# Patient Record
Sex: MALE | Race: WHITE | Employment: UNEMPLOYED | URBAN - METROPOLITAN AREA
[De-identification: names, ages, dates, MRNs, and addresses within clinical notes are randomized per-mention and may not be internally consistent; named-entity substitution may affect disease eponyms.]

---

## 2017-05-18 ENCOUNTER — HOSPITAL ENCOUNTER (OUTPATIENT)
Dept: PHYSICAL THERAPY | Age: 52
Discharge: HOME | End: 2017-05-18
Payer: MEDICARE

## 2017-05-18 PROCEDURE — G8985 CARRY GOAL STATUS: HCPCS

## 2017-05-18 PROCEDURE — G8984 CARRY CURRENT STATUS: HCPCS

## 2017-05-18 PROCEDURE — 97110 THERAPEUTIC EXERCISES: CPT

## 2017-05-18 PROCEDURE — 97162 PT EVAL MOD COMPLEX 30 MIN: CPT

## 2017-05-19 NOTE — PROGRESS NOTES
Ambulatory/Rehab Services H2 Model Falls Risk Assessment    Risk Factor Pts. ·   Confusion/Disorientation/Impulsivity  []    4 ·   Symptomatic Depression  []   2 ·   Altered Elimination  []   1 ·   Dizziness/Vertigo  []   1 ·   Gender (Male)  [x]   1 ·   Any administered antiepileptics (anticonvulsants):  []   2 ·   Any administered benzodiazepines:  []   1 ·   Visual Impairment (specify):  []   1 ·   Portable Oxygen Use  []   1 ·   Orthostatic ? BP  []   1 ·   History of Recent Falls (within 3 mos.)  []   5     Ability to Rise from Chair (choose one) Pts. ·   Ability to rise in a single movement  [x]   0 ·   Pushes up, successful in one attempt  []   1 ·   Multiple attempts, but successful  []   3 ·   Unable to rise without assistance  []   4   Total: (5 or greater = High Risk) 1     Falls Prevention Plan:   []                Physical Limitations to Exercise (specify):   []                Mobility Assistance Device (type):   []                Exercise/Equipment Adaptation (specify):    ©2010 [facility] [address] Patent #[id number].  Federal Law prohibits the replication, distribution or use without written permission from [facility] [employer]
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
## 2017-05-22 ENCOUNTER — HOSPITAL ENCOUNTER (OUTPATIENT)
Dept: PHYSICAL THERAPY | Age: 52
Discharge: HOME | End: 2017-05-22
Payer: MEDICARE

## 2017-05-22 PROCEDURE — 97110 THERAPEUTIC EXERCISES: CPT
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
## 2017-05-24 ENCOUNTER — HOSPITAL ENCOUNTER (OUTPATIENT)
Dept: PHYSICAL THERAPY | Age: 52
Discharge: HOME | End: 2017-05-24
Payer: MEDICARE

## 2017-05-24 PROCEDURE — 97140 MANUAL THERAPY 1/> REGIONS: CPT

## 2017-05-24 PROCEDURE — 97110 THERAPEUTIC EXERCISES: CPT
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
## 2017-05-31 ENCOUNTER — HOSPITAL ENCOUNTER (OUTPATIENT)
Dept: PHYSICAL THERAPY | Age: 52
Discharge: HOME | End: 2017-05-31
Payer: MEDICARE

## 2017-05-31 PROCEDURE — 97140 MANUAL THERAPY 1/> REGIONS: CPT

## 2017-05-31 PROCEDURE — 97110 THERAPEUTIC EXERCISES: CPT
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
## 2017-06-05 ENCOUNTER — HOSPITAL ENCOUNTER (OUTPATIENT)
Dept: PHYSICAL THERAPY | Age: 52
Discharge: HOME | End: 2017-06-05
Payer: MEDICARE

## 2017-06-05 PROCEDURE — 97110 THERAPEUTIC EXERCISES: CPT
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
## 2017-06-07 ENCOUNTER — HOSPITAL ENCOUNTER (OUTPATIENT)
Dept: PHYSICAL THERAPY | Age: 52
Discharge: HOME | End: 2017-06-07
Payer: MEDICARE

## 2017-06-07 PROCEDURE — 97140 MANUAL THERAPY 1/> REGIONS: CPT

## 2017-06-07 PROCEDURE — 97110 THERAPEUTIC EXERCISES: CPT
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
## 2017-06-12 ENCOUNTER — HOSPITAL ENCOUNTER (OUTPATIENT)
Dept: PHYSICAL THERAPY | Age: 52
Discharge: HOME | End: 2017-06-12
Payer: MEDICARE

## 2017-06-12 PROCEDURE — 97110 THERAPEUTIC EXERCISES: CPT

## 2017-06-12 PROCEDURE — 97140 MANUAL THERAPY 1/> REGIONS: CPT
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
## 2017-06-14 ENCOUNTER — HOSPITAL ENCOUNTER (OUTPATIENT)
Dept: PHYSICAL THERAPY | Age: 52
Discharge: HOME | End: 2017-06-14
Payer: MEDICARE

## 2017-06-14 PROCEDURE — 97014 ELECTRIC STIMULATION THERAPY: CPT

## 2017-06-14 PROCEDURE — 97110 THERAPEUTIC EXERCISES: CPT
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
## 2017-06-19 ENCOUNTER — HOSPITAL ENCOUNTER (OUTPATIENT)
Dept: PHYSICAL THERAPY | Age: 52
Discharge: HOME | End: 2017-06-19
Payer: MEDICARE

## 2017-06-19 PROCEDURE — 97110 THERAPEUTIC EXERCISES: CPT
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
## 2017-06-21 ENCOUNTER — HOSPITAL ENCOUNTER (OUTPATIENT)
Dept: PHYSICAL THERAPY | Age: 52
Discharge: HOME | End: 2017-06-21
Payer: MEDICARE

## 2017-06-21 PROCEDURE — G8985 CARRY GOAL STATUS: HCPCS

## 2017-06-21 PROCEDURE — G8986 CARRY D/C STATUS: HCPCS

## 2017-06-21 PROCEDURE — 97110 THERAPEUTIC EXERCISES: CPT
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
## 2017-09-01 ENCOUNTER — APPOINTMENT (OUTPATIENT)
Dept: GENERAL RADIOLOGY | Age: 52
End: 2017-09-01
Attending: ORTHOPAEDIC SURGERY
Payer: MEDICARE

## 2017-09-01 ENCOUNTER — HOSPITAL ENCOUNTER (OUTPATIENT)
Age: 52
Setting detail: OUTPATIENT SURGERY
Discharge: HOME | End: 2017-09-01
Attending: ORTHOPAEDIC SURGERY | Admitting: ORTHOPAEDIC SURGERY
Payer: MEDICARE

## 2017-09-01 ENCOUNTER — ANESTHESIA EVENT (OUTPATIENT)
Dept: SURGERY | Age: 52
End: 2017-09-01
Payer: MEDICARE

## 2017-09-01 ENCOUNTER — ANESTHESIA (OUTPATIENT)
Dept: SURGERY | Age: 52
End: 2017-09-01
Payer: MEDICARE

## 2017-09-01 VITALS
DIASTOLIC BLOOD PRESSURE: 81 MMHG | RESPIRATION RATE: 18 BRPM | BODY MASS INDEX: 35.13 KG/M2 | HEIGHT: 74 IN | OXYGEN SATURATION: 94 % | SYSTOLIC BLOOD PRESSURE: 135 MMHG | WEIGHT: 273.7 LBS | HEART RATE: 71 BPM | TEMPERATURE: 98.5 F

## 2017-09-01 DIAGNOSIS — Z96.9 RETAINED ORTHOPEDIC HARDWARE: Primary | ICD-10-CM

## 2017-09-01 PROCEDURE — 74011250637 HC RX REV CODE- 250/637: Performed by: ANESTHESIOLOGY

## 2017-09-01 PROCEDURE — 77030021122 HC SPLNT MAT FST BSNM -A: Performed by: ORTHOPAEDIC SURGERY

## 2017-09-01 PROCEDURE — 74011250636 HC RX REV CODE- 250/636: Performed by: ANESTHESIOLOGY

## 2017-09-01 PROCEDURE — 77030020143 HC AIRWY LARYN INTUB CGAS -A: Performed by: NURSE ANESTHETIST, CERTIFIED REGISTERED

## 2017-09-01 PROCEDURE — 76060000033 HC ANESTHESIA 1 TO 1.5 HR: Performed by: ORTHOPAEDIC SURGERY

## 2017-09-01 PROCEDURE — 73600 X-RAY EXAM OF ANKLE: CPT

## 2017-09-01 PROCEDURE — 76942 ECHO GUIDE FOR BIOPSY: CPT | Performed by: ORTHOPAEDIC SURGERY

## 2017-09-01 PROCEDURE — 76210000063 HC OR PH I REC FIRST 0.5 HR: Performed by: ORTHOPAEDIC SURGERY

## 2017-09-01 PROCEDURE — 77030000032 HC CUF TRNQT ZIMM -B: Performed by: ORTHOPAEDIC SURGERY

## 2017-09-01 PROCEDURE — 74011250636 HC RX REV CODE- 250/636: Performed by: NURSE PRACTITIONER

## 2017-09-01 PROCEDURE — 74011000250 HC RX REV CODE- 250

## 2017-09-01 PROCEDURE — 76010000161 HC OR TIME 1 TO 1.5 HR INTENSV-TIER 1: Performed by: ORTHOPAEDIC SURGERY

## 2017-09-01 PROCEDURE — 77030002933 HC SUT MCRYL J&J -A: Performed by: ORTHOPAEDIC SURGERY

## 2017-09-01 PROCEDURE — 77030008467 HC STPLR SKN COVD -B: Performed by: ORTHOPAEDIC SURGERY

## 2017-09-01 PROCEDURE — 77030018836 HC SOL IRR NACL ICUM -A: Performed by: ORTHOPAEDIC SURGERY

## 2017-09-01 PROCEDURE — 76210000020 HC REC RM PH II FIRST 0.5 HR: Performed by: ORTHOPAEDIC SURGERY

## 2017-09-01 PROCEDURE — 77030011640 HC PAD GRND REM COVD -A: Performed by: ORTHOPAEDIC SURGERY

## 2017-09-01 PROCEDURE — 77030020782 HC GWN BAIR PAWS FLX 3M -B: Performed by: NURSE ANESTHETIST, CERTIFIED REGISTERED

## 2017-09-01 PROCEDURE — 74011250636 HC RX REV CODE- 250/636

## 2017-09-01 PROCEDURE — 76010010054 HC POST OP PAIN BLOCK: Performed by: ORTHOPAEDIC SURGERY

## 2017-09-01 RX ORDER — SODIUM CHLORIDE, SODIUM LACTATE, POTASSIUM CHLORIDE, CALCIUM CHLORIDE 600; 310; 30; 20 MG/100ML; MG/100ML; MG/100ML; MG/100ML
100 INJECTION, SOLUTION INTRAVENOUS CONTINUOUS
Status: DISCONTINUED | OUTPATIENT
Start: 2017-09-01 | End: 2017-09-02 | Stop reason: HOSPADM

## 2017-09-01 RX ORDER — SODIUM CHLORIDE, SODIUM LACTATE, POTASSIUM CHLORIDE, CALCIUM CHLORIDE 600; 310; 30; 20 MG/100ML; MG/100ML; MG/100ML; MG/100ML
75 INJECTION, SOLUTION INTRAVENOUS
Status: DISCONTINUED | OUTPATIENT
Start: 2017-09-01 | End: 2017-09-02 | Stop reason: HOSPADM

## 2017-09-01 RX ORDER — CEFAZOLIN SODIUM IN 0.9 % NACL 2 G/50 ML
2 INTRAVENOUS SOLUTION, PIGGYBACK (ML) INTRAVENOUS
Status: COMPLETED | OUTPATIENT
Start: 2017-09-01 | End: 2017-09-01

## 2017-09-01 RX ORDER — HYDROMORPHONE HYDROCHLORIDE 2 MG/ML
0.5 INJECTION, SOLUTION INTRAMUSCULAR; INTRAVENOUS; SUBCUTANEOUS
Status: DISCONTINUED | OUTPATIENT
Start: 2017-09-01 | End: 2017-09-02 | Stop reason: HOSPADM

## 2017-09-01 RX ORDER — SODIUM CHLORIDE 0.9 % (FLUSH) 0.9 %
5-10 SYRINGE (ML) INJECTION EVERY 8 HOURS
Status: DISCONTINUED | OUTPATIENT
Start: 2017-09-01 | End: 2017-09-01 | Stop reason: HOSPADM

## 2017-09-01 RX ORDER — SODIUM CHLORIDE 0.9 % (FLUSH) 0.9 %
5-10 SYRINGE (ML) INJECTION AS NEEDED
Status: DISCONTINUED | OUTPATIENT
Start: 2017-09-01 | End: 2017-09-01 | Stop reason: HOSPADM

## 2017-09-01 RX ORDER — FENTANYL CITRATE 50 UG/ML
100 INJECTION, SOLUTION INTRAMUSCULAR; INTRAVENOUS ONCE
Status: COMPLETED | OUTPATIENT
Start: 2017-09-01 | End: 2017-09-01

## 2017-09-01 RX ORDER — PROPOFOL 10 MG/ML
INJECTION, EMULSION INTRAVENOUS AS NEEDED
Status: DISCONTINUED | OUTPATIENT
Start: 2017-09-01 | End: 2017-09-01 | Stop reason: HOSPADM

## 2017-09-01 RX ORDER — SODIUM CHLORIDE 0.9 % (FLUSH) 0.9 %
5-10 SYRINGE (ML) INJECTION AS NEEDED
Status: DISCONTINUED | OUTPATIENT
Start: 2017-09-01 | End: 2017-09-02 | Stop reason: HOSPADM

## 2017-09-01 RX ORDER — LIDOCAINE HYDROCHLORIDE 10 MG/ML
0.3 INJECTION INFILTRATION; PERINEURAL ONCE
Status: DISCONTINUED | OUTPATIENT
Start: 2017-09-01 | End: 2017-09-01 | Stop reason: HOSPADM

## 2017-09-01 RX ORDER — CEFAZOLIN SODIUM 1 G/3ML
INJECTION, POWDER, FOR SOLUTION INTRAMUSCULAR; INTRAVENOUS AS NEEDED
Status: DISCONTINUED | OUTPATIENT
Start: 2017-09-01 | End: 2017-09-01 | Stop reason: HOSPADM

## 2017-09-01 RX ORDER — DEXAMETHASONE SODIUM PHOSPHATE 4 MG/ML
INJECTION, SOLUTION INTRA-ARTICULAR; INTRALESIONAL; INTRAMUSCULAR; INTRAVENOUS; SOFT TISSUE AS NEEDED
Status: DISCONTINUED | OUTPATIENT
Start: 2017-09-01 | End: 2017-09-01 | Stop reason: HOSPADM

## 2017-09-01 RX ORDER — OXYCODONE HYDROCHLORIDE 5 MG/1
10 TABLET ORAL
Status: COMPLETED | OUTPATIENT
Start: 2017-09-01 | End: 2017-09-01

## 2017-09-01 RX ORDER — MIDAZOLAM HYDROCHLORIDE 1 MG/ML
2 INJECTION, SOLUTION INTRAMUSCULAR; INTRAVENOUS
Status: COMPLETED | OUTPATIENT
Start: 2017-09-01 | End: 2017-09-01

## 2017-09-01 RX ORDER — LIDOCAINE HYDROCHLORIDE 20 MG/ML
INJECTION, SOLUTION EPIDURAL; INFILTRATION; INTRACAUDAL; PERINEURAL AS NEEDED
Status: DISCONTINUED | OUTPATIENT
Start: 2017-09-01 | End: 2017-09-01 | Stop reason: HOSPADM

## 2017-09-01 RX ORDER — SODIUM CHLORIDE, SODIUM LACTATE, POTASSIUM CHLORIDE, CALCIUM CHLORIDE 600; 310; 30; 20 MG/100ML; MG/100ML; MG/100ML; MG/100ML
100 INJECTION, SOLUTION INTRAVENOUS CONTINUOUS
Status: DISCONTINUED | OUTPATIENT
Start: 2017-09-01 | End: 2017-09-01 | Stop reason: HOSPADM

## 2017-09-01 RX ORDER — FAMOTIDINE 20 MG/1
20 TABLET, FILM COATED ORAL ONCE
Status: COMPLETED | OUTPATIENT
Start: 2017-09-01 | End: 2017-09-01

## 2017-09-01 RX ORDER — ONDANSETRON 2 MG/ML
INJECTION INTRAMUSCULAR; INTRAVENOUS AS NEEDED
Status: DISCONTINUED | OUTPATIENT
Start: 2017-09-01 | End: 2017-09-01 | Stop reason: HOSPADM

## 2017-09-01 RX ADMIN — CEFAZOLIN SODIUM 2 G: 1 INJECTION, POWDER, FOR SOLUTION INTRAMUSCULAR; INTRAVENOUS at 19:01

## 2017-09-01 RX ADMIN — ONDANSETRON 4 MG: 2 INJECTION INTRAMUSCULAR; INTRAVENOUS at 19:37

## 2017-09-01 RX ADMIN — PROPOFOL 200 MG: 10 INJECTION, EMULSION INTRAVENOUS at 18:59

## 2017-09-01 RX ADMIN — FENTANYL CITRATE 100 MCG: 50 INJECTION, SOLUTION INTRAMUSCULAR; INTRAVENOUS at 16:47

## 2017-09-01 RX ADMIN — LIDOCAINE HYDROCHLORIDE 100 MG: 20 INJECTION, SOLUTION EPIDURAL; INFILTRATION; INTRACAUDAL; PERINEURAL at 18:59

## 2017-09-01 RX ADMIN — CEFAZOLIN 2 G: 1 INJECTION, POWDER, FOR SOLUTION INTRAMUSCULAR; INTRAVENOUS; PARENTERAL at 15:02

## 2017-09-01 RX ADMIN — FAMOTIDINE 20 MG: 20 TABLET ORAL at 15:02

## 2017-09-01 RX ADMIN — SODIUM CHLORIDE, SODIUM LACTATE, POTASSIUM CHLORIDE, AND CALCIUM CHLORIDE: 600; 310; 30; 20 INJECTION, SOLUTION INTRAVENOUS at 19:35

## 2017-09-01 RX ADMIN — DEXAMETHASONE SODIUM PHOSPHATE 10 MG: 4 INJECTION, SOLUTION INTRA-ARTICULAR; INTRALESIONAL; INTRAMUSCULAR; INTRAVENOUS; SOFT TISSUE at 19:37

## 2017-09-01 RX ADMIN — MIDAZOLAM HYDROCHLORIDE 2 MG: 1 INJECTION, SOLUTION INTRAMUSCULAR; INTRAVENOUS at 16:47

## 2017-09-01 RX ADMIN — SODIUM CHLORIDE, SODIUM LACTATE, POTASSIUM CHLORIDE, AND CALCIUM CHLORIDE 100 ML/HR: 600; 310; 30; 20 INJECTION, SOLUTION INTRAVENOUS at 15:02

## 2017-09-01 RX ADMIN — OXYCODONE HYDROCHLORIDE 10 MG: 5 TABLET ORAL at 20:15

## 2017-09-01 NOTE — IP AVS SNAPSHOT
[facility] 
 
 
 [address] [id number] 
[phone] Patient: Shannon Coleman MRN: [id number] :1965 You are allergic to the following Allergen Reactions Penicillins Unknown (comments) Allergic to penicillin as a child--unknown reaction Recent Documentation Height Weight BMI Smoking Status 1.88 m 124.1 kg 35.14 kg/m2 Former Smoker Emergency Contacts Name Discharge Info Relation Home Work Mobile Reese Willis [24] [phone] About your hospitalization You were admitted on:  2017 You last received care in the:  [facility] PACU You were discharged on:  2017 Unit phone number:  [phone] Why you were hospitalized Your primary diagnosis was:  Not on File Providers Seen During Your Hospitalizations Provider Role Specialty Primary office phone Raisa Morrow MD Attending Provider Orthopedic Surgery [phone] Your Primary Care Physician (PCP) Primary Care Physician Office Phone Office Fax Linda Mayes [phone] Follow-up Information Follow up With Details Comments Contact Info Andrew Paulino MD   [address] [phone] Your Appointments   8:50 AM EDT Fasting lab with [facility] [address] [place] [address] ([address] [place] [address]) [address] [place] [address]  
[phone] 2017 11:00 AM EDT Office Visit with Andrew Paulino MD  
[address] [place] [address] ([address] [place] [address] Street [place]) [address] [place] [address]  
[phone] Current Discharge Medication List  
  
CONTINUE these medications which have CHANGED Dose & Instructions Dispensing Information Comments Morning Noon Evening Bedtime  
 allopurinol 300 mg tablet Commonly known as:  Tania Ascencio What changed:   
- when to take this 
- additional instructions Your last dose was: Your next dose is:    
   
   
 Dose:  300 mg Take 1 Tab by mouth daily. Indications: GOUT  
 Quantity:  90 Tab Refills:  3 HYDROcodone-acetaminophen  mg tablet Commonly known as:  Orren Sharon Start taking on:  9/8/2017 What changed:  additional instructions Your last dose was: Your next dose is:    
   
   
 Dose:  1 Tab Take 1 Tab by mouth every six (6) hours. Max Daily Amount: 4 Tabs. Indications: Pain Quantity:  105 Tab Refills:  0  
     
   
   
   
  
 indomethacin 25 mg capsule Commonly known as:  INDOCIN What changed:  additional instructions Your last dose was: Your next dose is:    
   
   
 Dose:  25 mg Take 1 Cap by mouth three (3) times daily. Quantity:  30 Cap Refills:  5  
     
   
   
   
  
 lisinopril 20 mg tablet Commonly known as:  Kely Ga What changed:  additional instructions Your last dose was: Your next dose is:    
   
   
 Dose:  20 mg Take 1 Tab by mouth every morning. Takes in AM  
 Quantity:  90 Tab Refills:  3  
     
   
   
   
  
 metoprolol tartrate 50 mg tablet Commonly known as:  LOPRESSOR What changed:  additional instructions Your last dose was: Your next dose is:    
   
   
 Dose:  50 mg Take 1 Tab by mouth two (2) times a day. Quantity:  180 Tab Refills:  3 CONTINUE these medications which have NOT CHANGED Dose & Instructions Dispensing Information Comments Morning Noon Evening Bedtime FISH -160-1,000 mg Cap Generic drug:  omega 3-dha-epa-fish oil Your last dose was: Your next dose is:    
   
   
 Dose:  1 Tab Take 1 Tab by mouth daily.  Stop seven days prior to surgery per anesthesia protocol. Refills:  0  
     
   
   
   
  
 montelukast 10 mg tablet Commonly known as:  SINGULAIR Your last dose was: Your next dose is:    
   
   
 Dose:  10 mg Take 1 Tab by mouth daily. Quantity:  30 Tab Refills:  11  
     
   
   
   
  
 multivitamin tablet Commonly known as:  ONE A DAY Your last dose was: Your next dose is:    
   
   
 Dose:  1 Tab Take 1 Tab by mouth daily. Stop seven days prior to surgery per anesthesia protocol. Refills:  0 Discharge Instructions FOLLOW-UP APPT WITH DR Sammi Weir - 9/11/17 @ 9:00 AM 
WEIGHT BEARING AS TOLERATED RIGHT LOWER EXTREMITY IN CAM WALKER BOOT 
ELEVATE RIGHT LEG 
KEEP DRESSING CLEAN AND DRY 
 
ACTIVITY · As tolerated and as directed by your doctor. DIET · Clear liquids until no nausea or vomiting; then light diet for the first day. · Advance to regular diet on second day, unless your doctor orders otherwise. · If nausea and vomiting continues, call your doctor. PAIN 
· Take pain medication as directed by your doctor. · Call your doctor if pain is NOT relieved by medication. · DO NOT take aspirin of blood thinners unless directed by your doctor. CALL YOUR DOCTOR IF  
· Excessive bleeding that does not stop after holding pressure over the area · Temperature of 101 degrees F or above · Excessive redness, swelling or bruising, and/ or green or yellow, smelly discharge from incision AFTER ANESTHESIA · For the first 24 hours: DO NOT Drive, Drink alcoholic beverages, or Make important decisions. · Be aware of dizziness following anesthesia and while taking pain medication. After general anesthesia or intravenous sedation, for 24 hours or while taking prescription Narcotics: · Limit your activities · Do not drive and operate hazardous machinery · Do not make important personal or business decisions · Do  not drink alcoholic beverages · If you have not urinated within 8 hours after discharge, please contact your surgeon on call. *  Please give a list of your current medications to your Primary Care Provider. *  Please update this list whenever your medications are discontinued, doses are 
    changed, or new medications (including over-the-counter products) are added. *  Please carry medication information at all times in case of emergency situations. These are general instructions for a healthy lifestyle: No smoking/ No tobacco products/ Avoid exposure to second hand smoke Surgeon General's Warning:  Quitting smoking now greatly reduces serious risk to your health. Obesity, smoking, and sedentary lifestyle greatly increases your risk for illness A healthy diet, regular physical exercise & weight monitoring are important for maintaining a healthy lifestyle You may be retaining fluid if you have a history of heart failure or if you experience any of the following symptoms:  Weight gain of 3 pounds or more overnight or 5 pounds in a week, increased swelling in our hands or feet or shortness of breath while lying flat in bed. Please call your doctor as soon as you notice any of these symptoms; do not wait until your next office visit. Recognize signs and symptoms of STROKE: 
 
F-face looks uneven A-arms unable to move or move unevenly S-speech slurred or non-existent T-time-call 911 as soon as signs and symptoms begin-DO NOT go Back to bed or wait to see if you get better-TIME IS BRAIN. Discharge Orders Procedure Order Date Status Priority Quantity Spec Type Associated Dx CALL YOUR DOCTOR For: Severe uncontrolled pain. , Redness, tenderness, or signs of infection. 09/01/17 1534 Normal Routine 1  Retained orthopedic hardware [[id number]] Questions: For:  Severe uncontrolled pain. For:  Redness, tenderness, or signs of infection. ACTIVITY AFTER DISCHARGE Patient should: Resume activity as tolerated. 09/01/17 1534 Normal Routine 1  Retained orthopedic hardware [[id number]] Questions: Patient should:  Resume activity as tolerated. DIET REGULAR No added salt 09/01/17 1534 Normal Routine 1  Retained orthopedic hardware [[id number]] Questions: Additional options:  No added salt DRESSING, DO NOT REMOVE 09/01/17 1534 Normal Routine 1  Retained orthopedic hardware [[id number]] Comments:  Keep clean, dry and intact until next clinic visit. ACO Transitions of Care Introducing [facility] [address] offers a voluntary care coordination program to provide high quality service and care to [facility] fee-for-service beneficiaries. [facility] was designed to help you enhance your health and well-being through the following services: ? Transitions of Care  support for individuals who are transitioning from one care setting to another (example: Hospital to home). ? Chronic and Complex Care Coordination  support for individuals and caregivers of those with serious or chronic illnesses or with more than one chronic (ongoing) condition and those who take a number of different medications. If you meet specific medical criteria, a [address] [place] [address] Rd may call you directly to coordinate your care with your primary care physician and your other care providers. For questions about the [facility] programs, please, contact your physicians office. For general questions or additional information about Accountable Care Organizations: 
Please visit www.medicare.gov/acos. html or call 1-800-MEDICARE (2-730.927.5241) TTY users should call 0-565.414.7109. Introducing [facility]!    
 [address] introduces [employer] patient portal. Now you can access parts of your medical record, email your doctor's office, and request medication refills online. 1. In your internet browser, go to [email or web address] 2. Click on the First Time User? Click Here link in the Sign In box. You will see the New Member Sign Up page. 3. Enter your Cirro Access Code exactly as it appears below. You will not need to use this code after youve completed the sign-up process. If you do not sign up before the expiration date, you must request a new code. · Cirro Access Code: [email or web address] Expires: 10/7/2017  2:08 PM 
 
4. Enter the last four digits of your Social Security Number (xxxx) and Date of Birth (mm/dd/yyyy) as indicated and click Submit. You will be taken to the next sign-up page. 5. Create a Cirro ID. This will be your Cirro login ID and cannot be changed, so think of one that is secure and easy to remember. 6. Create a Cirro password. You can change your password at any time. 7. Enter your Password Reset Question and Answer. This can be used at a later time if you forget your password. 8. Enter your e-mail address. You will receive e-mail notification when new information is available in [address] 9. Click Sign Up. You can now view and download portions of your medical record. 10. Click the Download Summary menu link to download a portable copy of your medical information. If you have questions, please visit the Frequently Asked Questions section of the Cirro website. Remember, Cirro is NOT to be used for urgent needs. For medical emergencies, dial 911. Now available from your iPhone and Android! General Information Please provide this summary of care documentation to your next provider. Patient Signature:  ____________________________________________________________ Date:  ____________________________________________________________  
  
Monika Oka Provider Signature:  ____________________________________________________________ Date:  ____________________________________________________________

## 2017-09-01 NOTE — ANESTHESIA PROCEDURE NOTES
Popliteal block with ultrasound, saphenous without ultrasound    Start time: 9/1/2017 4:46 PM  End time: 9/1/2017 4:51 PM  Performed by: Rohini Ohara  Authorized by: Rohini Ohara       Pre-procedure:    Indications: at surgeon's request and post-op pain management    Preanesthetic Checklist: patient identified, risks and benefits discussed, site marked, timeout performed, anesthesia consent given and patient being monitored    Timeout Time: 16:46          Block Type:   Block Type:  Popliteal  Laterality:  Right  Monitoring:  Continuous pulse ox, frequent vital sign checks, heart rate, oxygen and responsive to questions  Injection Technique:  Single shot  Procedures: ultrasound guided    Prep: chlorhexidine    Location:  Lower thigh  Needle Type:  Stimuplex  Needle Gauge:  20 G  Needle Localization:  Ultrasound guidance  Medication Injected:  1.5%  mepivacaine  Volume (mL):  40    Assessment:  Number of attempts:  1  Injection Assessment:  Incremental injection every 5 mL, local visualized surrounding nerve on ultrasound, negative aspiration for blood, no intravascular symptoms, no paresthesia and ultrasound image on chart  Patient tolerance:  Patient tolerated the procedure well with no immediate complications  --------------------------------------------------------------    Saphenous block procedure note:    TO [id number]    Chlorprep, 25g needle, 1% Mepivicaine, 15ml at the proximal tibia and 5ml at the ankle without apparent complications

## 2017-09-02 NOTE — ANESTHESIA POSTPROCEDURE EVALUATION
Post-Anesthesia Evaluation and Assessment    Patient: Gage Smith MRN: [id number]  SSN: [id number]    YOB: 1965  Age: 46 y.o. Sex: male       Cardiovascular Function/Vital Signs  Visit Vitals    /81    Pulse 73    Temp 37 °C (98.6 °F)    Resp 18    Ht 6' 2\" (1.88 m)    Wt 124.1 kg (273 lb 11.2 oz)    SpO2 94%    BMI 35.14 kg/m2       Patient is status post general anesthesia for Procedure(s):  RIGHT TIBIA AND FIBULA REMOVAL DEEP HARDWARE  CHOICE ANES. Nausea/Vomiting: None    Postoperative hydration reviewed and adequate. Pain:  Pain Scale 1: Numeric (0 - 10) (09/01/17 2016)  Pain Intensity 1: 3 (09/01/17 2016)   Managed    Neurological Status:   Neuro (WDL): Exceptions to WDL (09/01/17 2001)  Neuro  Neurologic State: Drowsy (09/01/17 2001)  RLE Motor Response: Numbness (block) (09/01/17 2001)   At baseline    Mental Status and Level of Consciousness: Awake, alert    Pulmonary Status:   O2 Device: Room air (09/01/17 2017)   Adequate oxygenation and airway patent    Complications related to anesthesia: None    Post-anesthesia assessment completed.  No concerns    Signed By: Feliberto Xie MD     September 1, 2017

## 2017-09-02 NOTE — DISCHARGE INSTRUCTIONS
FOLLOW-UP APPT WITH DR Jefferson Blackmon - 9/11/17 @ 9:00 AM  WEIGHT BEARING AS TOLERATED RIGHT LOWER EXTREMITY IN CAM WALKER BOOT  ELEVATE RIGHT LEG  KEEP DRESSING CLEAN AND DRY    ACTIVITY  · As tolerated and as directed by your doctor. DIET  · Clear liquids until no nausea or vomiting; then light diet for the first day. · Advance to regular diet on second day, unless your doctor orders otherwise. · If nausea and vomiting continues, call your doctor. PAIN  · Take pain medication as directed by your doctor. · Call your doctor if pain is NOT relieved by medication. · DO NOT take aspirin of blood thinners unless directed by your doctor. CALL YOUR DOCTOR IF   · Excessive bleeding that does not stop after holding pressure over the area  · Temperature of 101 degrees F or above  · Excessive redness, swelling or bruising, and/ or green or yellow, smelly discharge from incision    AFTER ANESTHESIA   · For the first 24 hours: DO NOT Drive, Drink alcoholic beverages, or Make important decisions. · Be aware of dizziness following anesthesia and while taking pain medication. After general anesthesia or intravenous sedation, for 24 hours or while taking prescription Narcotics:  · Limit your activities  · Do not drive and operate hazardous machinery  · Do not make important personal or business decisions  · Do  not drink alcoholic beverages  · If you have not urinated within 8 hours after discharge, please contact your surgeon on call. *  Please give a list of your current medications to your Primary Care Provider. *  Please update this list whenever your medications are discontinued, doses are      changed, or new medications (including over-the-counter products) are added. *  Please carry medication information at all times in case of emergency situations.       These are general instructions for a healthy lifestyle:    No smoking/ No tobacco products/ Avoid exposure to second hand smoke    Surgeon General's Warning:  Quitting smoking now greatly reduces serious risk to your health. Obesity, smoking, and sedentary lifestyle greatly increases your risk for illness    A healthy diet, regular physical exercise & weight monitoring are important for maintaining a healthy lifestyle    You may be retaining fluid if you have a history of heart failure or if you experience any of the following symptoms:  Weight gain of 3 pounds or more overnight or 5 pounds in a week, increased swelling in our hands or feet or shortness of breath while lying flat in bed. Please call your doctor as soon as you notice any of these symptoms; do not wait until your next office visit. Recognize signs and symptoms of STROKE:    F-face looks uneven    A-arms unable to move or move unevenly    S-speech slurred or non-existent    T-time-call 911 as soon as signs and symptoms begin-DO NOT go       Back to bed or wait to see if you get better-TIME IS BRAIN.

## 2017-09-03 NOTE — OP NOTES
[address]   OPERATIVE REPORT       Name:  Fara Reed   MR#:  [id number]   :  1965   Account #:  [de-identified]   Date of Adm:  2017       DATE OF SURGERY: 2017    PREOPERATIVE DIAGNOSES   1. Mechanical complications of hardware, right tibia. 2. Mechanical complications of hardware, right fibula. POSTOPERATIVE DIAGNOSES   1. Mechanical complications of hardware, right tibia. 2. Mechanical complications of hardware, right fibula. PROCEDURE   1. Removal of deep hardware, right tibia. 2. Removal of deep hardware, right fibula. OPERATING TEAM: Alex Perea. MD Jared    ANESTHESIA: General with a block for postoperative pain relief. PROCEDURE: Patient brought to the operative suite, placed in   supine position. After adequate anesthesia was achieved in the   form of a general, the patient had tourniquet applied to the   right thigh with adequate padding and Sof-Rol. It was preset to   a level of 300 mmHg. Right lower extremity was then prepped and   draped in the usual sterile fashion. It was elevated and   exsanguinated with the Esmarch. Tourniquet was inflated. Incision was made over the lateral aspect of the distal fibula   along the line of previous skin incision. Hemostasis achieved   with Bovie cautery. Fascia incised along the line of the skin   incision. The peroneal tendons and superficial peroneal nerves   were identified and protected. The underlying plate was   identified. There was a little bit of bony overgrowth, so   a small osteotome was used to remove the bony overgrowth. The   plate and screws were then removed without difficulty. The   fracture was healed anatomically. This wound was irrigated with   normal saline and closed in layers. Turning our attention to the medial side, incision was made from   the medial malleolus up along the medial aspect of the tibia,   mostly along the line of previous skin incision.  Hemostasis was achieved with Bovie cautery. Fascia incised along the line of   the skin incision. Greater saphenous vein was identified and   protected throughout the case. The underlying plate was   then identified. There was some bony overgrowth, which was   removed with an osteotome. Screws were then removed without   difficulty, and the osteotome was used to remove the plate   without difficulty. A rongeur was used to smooth the bony   prominences through the screw holes. The wound was then   irrigated with normal saline. It was then closed in layers. AP,   lateral and oblique fluoroscopic images confirmed the fracture   was reduced anatomically and the hardware was in good position. Sterile compressive dressings and a Cam walker boot were   applied. Tourniquet was deflated. The patient was transferred to   the recovery room alert and oriented under the care of   Anesthesia. ESTIMATED BLOOD LOSS: Minimal.    FLUIDS: See Anesthesia record. CLOSURE: Primary. COMPLICATIONS: None. MD Jean-Paul De La O / Vee Hoyos   D:  09/03/2017   10:11   T:  09/03/2017   18:07   Job #:  [id number]

## 2017-09-18 ENCOUNTER — HOSPITAL ENCOUNTER (OUTPATIENT)
Dept: PHYSICAL THERAPY | Age: 52
Discharge: HOME | End: 2017-09-18
Payer: MEDICARE

## 2017-09-18 PROCEDURE — 97110 THERAPEUTIC EXERCISES: CPT

## 2017-09-18 PROCEDURE — 97016 VASOPNEUMATIC DEVICE THERAPY: CPT

## 2017-09-18 PROCEDURE — G8978 MOBILITY CURRENT STATUS: HCPCS

## 2017-09-18 PROCEDURE — G8979 MOBILITY GOAL STATUS: HCPCS

## 2017-09-18 PROCEDURE — 97161 PT EVAL LOW COMPLEX 20 MIN: CPT

## 2017-09-18 NOTE — PROGRESS NOTES
Ambulatory/Rehab Services H2 Model Falls Risk Assessment    Risk Factor Pts. ·   Confusion/Disorientation/Impulsivity  []    4 ·   Symptomatic Depression  []   2 ·   Altered Elimination  []   1 ·   Dizziness/Vertigo  []   1 ·   Gender (Male)  [x]   1 ·   Any administered antiepileptics (anticonvulsants):  []   2 ·   Any administered benzodiazepines:  []   1 ·   Visual Impairment (specify):  []   1 ·   Portable Oxygen Use  []   1 ·   Orthostatic ? BP  []   1 ·   History of Recent Falls (within 3 mos.)  []   5     Ability to Rise from Chair (choose one) Pts. ·   Ability to rise in a single movement  [x]   0 ·   Pushes up, successful in one attempt  []   1 ·   Multiple attempts, but successful  []   3 ·   Unable to rise without assistance  []   4   Total: (5 or greater = High Risk) 1     Falls Prevention Plan:   []                Physical Limitations to Exercise (specify):   []                Mobility Assistance Device (type):   []                Exercise/Equipment Adaptation (specify):    ©2010 [facility] [address] Patent #[id number].  Federal Law prohibits the replication, distribution or use without written permission from [facility] [employer]

## 2017-09-18 NOTE — PROGRESS NOTES
Fatimah Petty  : 1965 [facility]  [address], [address], [place]  [phone]   [phone]         OUTPATIENT PHYSICAL THERAPY:Initial Assessment and Daily Note 2017    ICD-10: Treatment Diagnosis: Pain in right ankle and joints of right foot (M25.571); Stiffness of right ankle, not elsewhere classified (M25.671)  Precautions/Allergies:   Penicillins   Fall Risk Score: 1 (? 5 = High Risk)  MD Orders: ROM, strengthening, WBAT MEDICAL/REFERRING DIAGNOSIS:  s/p hardware removal right ankle   DATE OF ONSET: Sx on 17  REFERRING PHYSICIAN: Sukhdeep Ca MD  RETURN PHYSICIAN APPOINTMENT: 10/2/17     INITIAL ASSESSMENT:  Mr. Ludivina Romo presents to PT eval s/p hardware removal of R ankle. He had screws placed in his ankle from a work related injury back in , and they had begun to bother him and cause him a lot of pain. Today, he displayed decreased balance, R ankle ROM, and R ankle strength. He will benefit from continued skilled PT services to improve his deficits listed below and to progress towards his PLOF. PROBLEM LIST (Impacting functional limitations):  1. Decreased Strength  2. Decreased ADL/Functional Activities  3. Decreased Transfer Abilities  4. Decreased Ambulation Ability/Technique  5. Decreased Balance  6. Increased Pain  7. Decreased Flexibility/Joint Mobility  8. Edema/Girth  9. Decreased [place] with Home Exercise Program INTERVENTIONS PLANNED:  1. Balance Exercise  2. Cold  3. Electrical Stimulation  4. Gait Training  5. Home Exercise Program (HEP)  6. Manual Therapy  7. Range of Motion (ROM)  8. Therapeutic Activites  9. Therapeutic Exercise/Strengthening  10. Transcutaneous Electrical Nerve Stimulation (TENS)   TREATMENT PLAN:  Effective Dates: 17 TO 18.   Frequency/Duration: 2 times a week for 3 weeks  GOALS: (Goals have been discussed and agreed upon with patient.)  Discharge Goals: Time Frame: 3 weeks  1. Pt will be independent w/ his HEP in order to decrease pain and increase functional mobility. 2. Pt will have R ankle ROM equal to that of his L ankle ROM in order to increase functional mobility. 3. Pt will have R ankle strength equal to that of his L ankle in order to increase his functional mobility. 4. Pt will have FAAM score of 80 or more in order to report an increase in functional mobility and a decrease in pain. Rehabilitation Potential For Stated Goals: Good  Regarding Kramer Yanick Puckett's therapy, I certify that the treatment plan above will be carried out by a therapist or under their direction. Thank you for this referral,  Smith Infante PT     Referring Physician Signature: Anthony Lopez MD              Date                    The information in this section was collected on 9/18/17 (except where otherwise noted). HISTORY:   History of Present Injury/Illness (Reason for Referral):  Pt injured at work in April 2012 - at work helping a coworker pull down a pipe when it fell down on his L shoulder and R ankle. He is now on disability from that accident. He had screws placed in his R ankle in 2012, and they were removed on 9/1/17. He is WBAT. Past Medical History/Comorbidities:   Mr. Marcus Longo  has a past medical history of Acromioclavicular joint arthritis; Allergic rhinitis (8/11/2016); Arrhythmia; Arthritis; Cervical radiculopathy (8/11/2016); Chronic pain; COPD (chronic obstructive pulmonary disease) ([facility] [address] (08/11/2016); Deficiency, lipoprotein (8/11/2016); Depression (8/11/2016); Disorders of bursae and tendons in shoulder region, unspecified (12/23/2011); Dyslipidemia (8/11/2016); Dyspnea (8/11/2016); ED (erectile dysfunction) (8/11/2016); Encounter for long-term (current) use of medications (8/11/2016); Encounter for prostate cancer screening (8/11/2016); Gout; Gout (8/11/2016); HTN (hypertension), benign (8/11/2016); Hypercholesterolemia;  Hypertension; Insomnia; Leg pain (8/11/2016); Lumbar back pain (8/11/2016); Metabolic syndrome (2/16/4714); Primary localized osteoarthrosis, shoulder region (11/10/2011); [facility] Seasonal allergies; Shoulder pain (8/11/2016); Sprain and strain of other specified sites of shoulder and upper arm (11/10/2011); Superior glenoid labrum lesion (11/10/2011); Supraspinatus (muscle) (tendon) sprain (11/10/2011); and Unspecified adverse effect of anesthesia. He also has no past medical history of Adverse effect of anesthesia; COPD; Difficult intubation; Malignant hyperthermia due to anesthesia; Nausea & vomiting; or Pseudocholinesterase deficiency. Mr. Neil Alvarado  has a past surgical history that includes upper arm/elbow surgery unlisted (1997); removal anal fistula,subcutaneous (2005); cervical fusion (2011); back surgery (2001); and shoulder arthroscopy (11/2011). Social History/Living Environment:     Disabled, lives alone in mobile home  Prior Level of Function/Work/Activity:  Disabled, able to perform yard work as needed   Dominant Side:         RIGHT    Current Medications:       Current Outpatient Prescriptions:     multivitamin (ONE A DAY) tablet, Take 1 Tab by mouth daily. Stop seven days prior to surgery per anesthesia protocol., Disp: , Rfl:     omega 3-dha-epa-fish oil (FISH OIL) 100-160-1,000 mg cap, Take 1 Tab by mouth daily. Stop seven days prior to surgery per anesthesia protocol., Disp: , Rfl:     HYDROcodone-acetaminophen (NORCO)  mg tablet, Take 1 Tab by mouth every six (6) hours. Max Daily Amount: 4 Tabs. Indications: Pain (Patient taking differently: Take 1 Tab by mouth every six (6) hours. Take day of surgery per anesthesia protocol. Indications: Pain), Disp: 105 Tab, Rfl: 0    metoprolol tartrate (LOPRESSOR) 50 mg tablet, Take 1 Tab by mouth two (2) times a day. (Patient taking differently: Take 50 mg by mouth two (2) times a day. Take day of surgery per anesthesia protocol. Indications: heart rate control), Disp: 180 Tab, Rfl: 3    lisinopril (PRINIVIL, ZESTRIL) 20 mg tablet, Take 1 Tab by mouth every morning. Takes in AM (Patient taking differently: Take 20 mg by mouth every morning. Takes in AM  Indications: hypertension), Disp: 90 Tab, Rfl: 3    indomethacin (INDOCIN) 25 mg capsule, Take 1 Cap by mouth three (3) times daily. (Patient taking differently: Take 25 mg by mouth three (3) times daily. Stop 5 days prior to surgery per anesthesia protocol. Indications: GOUT), Disp: 30 Cap, Rfl: 5    allopurinol (ZYLOPRIM) 300 mg tablet, Take 1 Tab by mouth daily. Indications: GOUT (Patient taking differently: Take 300 mg by mouth every morning. Take day of surgery per anesthesia protocol. Indications: GOUT), Disp: 90 Tab, Rfl: 3    montelukast (SINGULAIR) 10 mg tablet, Take 1 Tab by mouth daily. , Disp: 30 Tab, Rfl: 11   Date Last Reviewed:  9/18/2017    Number of Personal Factors/Comorbidities that affect the Plan of Care: 1-2: MODERATE COMPLEXITY   EXAMINATION:   Observation/Orthostatic Postural Assessment:          Rounded shoulders in sit and stand  Palpation:          Tender along incisions and malleoli   Girth:        58 cm on L, 59.5 cm on R - figure 8 test        28 cm L, 30 cm R - circumference of malleoli  Functional Mobility:         Gait/Ambulation:  Independent, minimal antalgic gait         Transfers:  Independent, no UE assist needed   Balance:          Decreased SLS on B LEs w/ R worse than L  Sensation:        Intact to B LEs w/ light touch, some numbness around incision     Joint/Muscle ROM Strength    DF 7* on L, 4* on R L 5/5, R 4/5    PF 60* on L, 55* on R L 5/5, R 4/5    Inversion 35* on L, 28* on R L 5/5, R 4/5    Eversion 22* on L, 20* on R L 5/5, R 4/5         Body Structures Involved:  1. Bones  2. Joints  3. Muscles Body Functions Affected:  1. Sensory/Pain  2. Neuromusculoskeletal  3. Movement Related Activities and Participation Affected:  1.  General Tasks and Demands  2. Mobility  3. Domestic Life  4. Interpersonal Interactions and Relationships  5. Community, Social and [place]   Number of elements (examined above) that affect the Plan of Care: 3: MODERATE COMPLEXITY   CLINICAL PRESENTATION:   Presentation: Stable and uncomplicated: LOW COMPLEXITY   CLINICAL DECISION MAKING:   Outcome Measure: Tool Used: PT/OT FOOT AND ANKLE ABILITY MEASURE  Score:  Initial: 77/84 Most Recent: X (Date: -- )   Interpretation of Score: For the \"Activities of Daily Living\", there are 21 questions each scored on a 5 point scale with 0 representing \"Unable to do\" and 4 representing \"No difficulty\". The lower the score, the greater the functional disability. 84/84 represents no disability. Minimal detectable change is 5.7 points. With the addition of the 8 questions in the \"Sports Subscale,\" there are 29 questions, each scored on a 5 point scale with 0 representing \"Unable to do\" and 4 representing \"No difficulty\". The lower the score, the greater the functional disability. 116/116 represents no disability. Minimal detectable change is 12.3 points. Activities of Daily Living:  Score 84 83-68 67-51 50-34 33-18 17-1 0   Modifier CH CI CJ CK CL CM CN     Activities of Daily Living + Sports Subscale:  Score 116 115-94 93-71 70-47 46-24 23-1 0   Modifier CH CI CJ CK CL CM CN     ? Mobility - Walking and Moving Around:     - CURRENT STATUS: CI - 1%-19% impaired, limited or restricted    - GOAL STATUS: CI - 1%-19% impaired, limited or restricted    - D/C STATUS:  ---------------To be determined---------------      Medical Necessity:   · Patient is expected to demonstrate progress in strength, range of motion and balance to increase independence with functional tasks. Reason for Services/Other Comments:  · Patient continues to demonstrate capacity to improve strength, ROM, and mobility which will increase independence.    Use of outcome tool(s) and clinical judgement create a POC that gives a: Clear prediction of patient's progress: LOW COMPLEXITY            TREATMENT:   (In addition to Assessment/Re-Assessment sessions the following treatments were rendered)  Pre-treatment Symptoms/Complaints:  See above. Pain: Initial:     1/10 Post Session:  0/10     THERAPEUTIC EXERCISE: (10 minutes):  Exercises per grid below to improve mobility, strength and balance. Required minimal verbal and manual cues to promote proper body alignment, promote proper body posture and promote proper body mechanics. Progressed resistance, range, repetitions and complexity of movement as indicated. Date:  9/18/17 Date:   Date:     Activity/Exercise Parameters Parameters Parameters   HEP Issued     Calf stretch 3x30 sec w/ belt     Inversion 20x, YTB     Eversion 20x, YTB     DF 20x, YTB     PF 20x, YTB             MODALITIES: (15 minutes):      *  Cold Pack Therapy in order to provide analgesia and reduce inflammation and edema. Treatment/Session Assessment:    · Response to Treatment:  Pt challenged w/ DF, eversion, and inversion therex this session. He reported pain resolved following ice/compression. · Compliance with Program/Exercises: Will assess as treatment progresses. · Recommendations/Intent for next treatment session: \"Next visit will focus on advancements to more challenging activities\".   Variance from POC: none  PT Patient Time In/Time Out  Time In: 1400  Time Out: [address], PT

## 2017-09-20 ENCOUNTER — HOSPITAL ENCOUNTER (OUTPATIENT)
Dept: PHYSICAL THERAPY | Age: 52
Discharge: HOME | End: 2017-09-20
Payer: MEDICARE

## 2017-09-20 PROCEDURE — 97016 VASOPNEUMATIC DEVICE THERAPY: CPT

## 2017-09-20 PROCEDURE — 97110 THERAPEUTIC EXERCISES: CPT

## 2017-09-20 NOTE — PROGRESS NOTES
Conrad Nur  : 1965 [facility] at [facility]  [facility] [address], [place] [address]  [phone]   [phone]         OUTPATIENT PHYSICAL THERAPY:Daily Note 2017    ICD-10: Treatment Diagnosis: Pain in right ankle and joints of right foot (M25.571); Stiffness of right ankle, not elsewhere classified (M25.671)  Precautions/Allergies:   Penicillins   Fall Risk Score: 1 (? 5 = High Risk)  MD Orders: ROM, strengthening, WBAT MEDICAL/REFERRING DIAGNOSIS:  s/p hardware removal right ankle   DATE OF ONSET: Sx on 17  REFERRING PHYSICIAN: Ty Spivey MD  RETURN PHYSICIAN APPOINTMENT: 10/2/17     INITIAL ASSESSMENT:  Mr. Rick Ahuja presents to PT eval s/p hardware removal of R ankle. He had screws placed in his ankle from a work related injury back in , and they had begun to bother him and cause him a lot of pain. Today, he displayed decreased balance, R ankle ROM, and R ankle strength. He will benefit from continued skilled PT services to improve his deficits listed below and to progress towards his PLOF. PROBLEM LIST (Impacting functional limitations):  1. Decreased Strength  2. Decreased ADL/Functional Activities  3. Decreased Transfer Abilities  4. Decreased Ambulation Ability/Technique  5. Decreased Balance  6. Increased Pain  7. Decreased Flexibility/Joint Mobility  8. Edema/Girth  9. Decreased [employer] with Home Exercise Program INTERVENTIONS PLANNED:  1. Balance Exercise  2. Cold  3. Electrical Stimulation  4. Gait Training  5. Home Exercise Program (HEP)  6. Manual Therapy  7. Range of Motion (ROM)  8. Therapeutic Activites  9. Therapeutic Exercise/Strengthening  10. Transcutaneous Electrical Nerve Stimulation (TENS)   TREATMENT PLAN:  Effective Dates: 17 TO 18. Frequency/Duration: 2 times a week for 3 weeks  GOALS: (Goals have been discussed and agreed upon with patient.)  Discharge Goals: Time Frame: 3 weeks  1.  Pt will be independent w/ his HEP in order to decrease pain and increase functional mobility. 2. Pt will have R ankle ROM equal to that of his L ankle ROM in order to increase functional mobility. 3. Pt will have R ankle strength equal to that of his L ankle in order to increase his functional mobility. 4. Pt will have FAAM score of 80 or more in order to report an increase in functional mobility and a decrease in pain. Rehabilitation Potential For Stated Goals: Good  Regarding Viktor Puckett's therapy, I certify that the treatment plan above will be carried out by a therapist or under their direction. Thank you for this referral,  Francesco Arenas PT                 The information in this section was collected on 9/18/17 (except where otherwise noted). HISTORY:   History of Present Injury/Illness (Reason for Referral):  Pt injured at work in April 2012 - at work helping a coworker pull down a pipe when it fell down on his L shoulder and R ankle. He is now on disability from that accident. He had screws placed in his R ankle in 2012, and they were removed on 9/1/17. He is WBAT. Past Medical History/Comorbidities:   Mr. Ramesh Ryan  has a past medical history of Acromioclavicular joint arthritis; Allergic rhinitis (8/11/2016); Arrhythmia; Arthritis; Cervical radiculopathy (8/11/2016); Chronic pain; [facility] obstructive [facility] disease) [facility] (08/11/2016); Deficiency, lipoprotein (8/11/2016); Depression (8/11/2016); Disorders of bursae and tendons in shoulder region, unspecified (12/23/2011); Dyslipidemia (8/11/2016); Dyspnea (8/11/2016); ED (erectile dysfunction) (8/11/2016); Encounter for long-term (current) use of medications (8/11/2016); Encounter for prostate cancer screening (8/11/2016); Gout; Gout (8/11/2016); HTN (hypertension), benign (8/11/2016); Hypercholesterolemia; Hypertension; Insomnia; Leg pain (8/11/2016); Lumbar back pain (8/11/2016); Metabolic syndrome (3/11/3112);  Primary localized osteoarthrosis, shoulder region (11/10/2011); RA (rheumatoid arthritis) ([facility] Seasonal allergies; Shoulder pain (8/11/2016); Sprain and strain of other specified sites of shoulder and upper arm (11/10/2011); Superior glenoid labrum lesion (11/10/2011); Supraspinatus (muscle) (tendon) sprain (11/10/2011); and Unspecified adverse effect of anesthesia. He also has no past medical history of Adverse effect of anesthesia; COPD; Difficult intubation; Malignant hyperthermia due to anesthesia; Nausea & vomiting; or Pseudocholinesterase deficiency. Mr. Chapo Naylor  has a past surgical history that includes upper arm/elbow surgery unlisted (1997); removal anal fistula,subcutaneous (2005); cervical fusion (2011); back surgery (2001); and shoulder arthroscopy (11/2011). Social History/Living Environment:     Disabled, lives alone in mobile home  Prior Level of Function/Work/Activity:  Disabled, able to perform yard work as needed   Dominant Side:         RIGHT    Current Medications:       Current Outpatient Prescriptions:     multivitamin (ONE A DAY) tablet, Take 1 Tab by mouth daily. Stop seven days prior to surgery per anesthesia protocol., Disp: , Rfl:     omega 3-dha-epa-fish oil (FISH OIL) 100-160-1,000 mg cap, Take 1 Tab by mouth daily. Stop seven days prior to surgery per anesthesia protocol., Disp: , Rfl:     HYDROcodone-acetaminophen (NORCO)  mg tablet, Take 1 Tab by mouth every six (6) hours. Max Daily Amount: 4 Tabs. Indications: Pain (Patient taking differently: Take 1 Tab by mouth every six (6) hours. Take day of surgery per anesthesia protocol. Indications: Pain), Disp: 105 Tab, Rfl: 0    metoprolol tartrate (LOPRESSOR) 50 mg tablet, Take 1 Tab by mouth two (2) times a day. (Patient taking differently: Take 50 mg by mouth two (2) times a day. Take day of surgery per anesthesia protocol.   Indications: heart rate control), Disp: 180 Tab, Rfl: 3    lisinopril (PRINIVIL, ZESTRIL) 20 mg tablet, Take 1 Tab by mouth every morning. Takes in AM (Patient taking differently: Take 20 mg by mouth every morning. Takes in AM  Indications: hypertension), Disp: 90 Tab, Rfl: 3    indomethacin (INDOCIN) 25 mg capsule, Take 1 Cap by mouth three (3) times daily. (Patient taking differently: Take 25 mg by mouth three (3) times daily. Stop 5 days prior to surgery per anesthesia protocol. Indications: GOUT), Disp: 30 Cap, Rfl: 5    allopurinol (ZYLOPRIM) 300 mg tablet, Take 1 Tab by mouth daily. Indications: GOUT (Patient taking differently: Take 300 mg by mouth every morning. Take day of surgery per anesthesia protocol. Indications: GOUT), Disp: 90 Tab, Rfl: 3    montelukast (SINGULAIR) 10 mg tablet, Take 1 Tab by mouth daily. , Disp: 30 Tab, Rfl: 11   Date Last Reviewed:  9/20/2017    EXAMINATION:   Observation/Orthostatic Postural Assessment:          Rounded shoulders in sit and stand  Palpation:          Tender along incisions and malleoli   Girth:        58 cm on L, 59.5 cm on R - figure 8 test        28 cm L, 30 cm R - circumference of malleoli  Functional Mobility:         Gait/Ambulation:  Independent, minimal antalgic gait         Transfers:  Independent, no UE assist needed   Balance:          Decreased SLS on B LEs w/ R worse than L  Sensation:        Intact to B LEs w/ light touch, some numbness around incision     Joint/Muscle ROM Strength    DF 7* on L, 4* on R L 5/5, R 4/5    PF 60* on L, 55* on R L 5/5, R 4/5    Inversion 35* on L, 28* on R L 5/5, R 4/5    Eversion 22* on L, 20* on R L 5/5, R 4/5         Body Structures Involved:  1. Bones  2. Joints  3. Muscles Body Functions Affected:  1. Sensory/Pain  2. Neuromusculoskeletal  3. Movement Related Activities and Participation Affected:  1. General Tasks and Demands  2. Mobility  3. Domestic Life  4. Interpersonal Interactions and Relationships  5.  Community, Social and Civic Life   CLINICAL PRESENTATION:   CLINICAL DECISION MAKING:   Outcome Measure: Tool Used: PT/OT FOOT AND ANKLE ABILITY MEASURE  Score:  Initial: 77/84 Most Recent: X (Date: -- )   Interpretation of Score: For the \"Activities of Daily Living\", there are 21 questions each scored on a 5 point scale with 0 representing \"Unable to do\" and 4 representing \"No difficulty\". The lower the score, the greater the functional disability. 84/84 represents no disability. Minimal detectable change is 5.7 points. With the addition of the 8 questions in the \"Sports Subscale,\" there are 29 questions, each scored on a 5 point scale with 0 representing \"Unable to do\" and 4 representing \"No difficulty\". The lower the score, the greater the functional disability. 116/116 represents no disability. Minimal detectable change is 12.3 points. Activities of Daily Living:  Score 84 83-68 67-51 50-34 33-18 17-1 0   Modifier CH CI CJ CK CL CM CN     Activities of Daily Living + Sports Subscale:  Score 116 115-94 93-71 70-47 46-24 23-1 0   Modifier CH CI CJ CK CL CM CN     ? Mobility - Walking and Moving Around:     - CURRENT STATUS: CI - 1%-19% impaired, limited or restricted    - GOAL STATUS: CI - 1%-19% impaired, limited or restricted    - D/C STATUS:  ---------------To be determined---------------      Medical Necessity:   · Patient is expected to demonstrate progress in strength, range of motion and balance to increase independence with functional tasks. Reason for Services/Other Comments:  · Patient continues to demonstrate capacity to improve strength, ROM, and mobility which will increase independence. TREATMENT:   (In addition to Assessment/Re-Assessment sessions the following treatments were rendered)  Pre-treatment Symptoms/Complaints:  Pt reports he did not complete his HEP because he was busy. Pain: Initial:     3/10 Post Session:  0/10     THERAPEUTIC EXERCISE: (42 minutes):  Exercises per grid below to improve mobility, strength and balance.   Required minimal verbal and manual cues to promote proper body alignment, promote proper body posture and promote proper body mechanics. Progressed resistance, range, repetitions and complexity of movement as indicated. Date:  9/18/17 Date:  9/20/17 Date:     Activity/Exercise Parameters Parameters Parameters   HEP Issued     Calf stretch 3x30 sec w/ belt     Inversion 20x, YTB 30x, YTB    Eversion 20x, YTB 30x, YTB    DF 20x, YTB 30x, YTB    PF 20x, YTB 30x, YTB    Bike  6 mins    Wobble board  30x forward back, side to side, rotation    Toe curls  10x    Calf stretch  3x30 sec on wedge    Soleus stretch  3x30 sec on wedge    Step downs  30x on 3 inch step    Step ups  20x on 8 inch step    SLS star balance   20x 3 different positions standing on R LE in //    Heel taps  1 inch step, 20x    Balance  Tandem       MODALITIES: (15 minutes):      *  Cold Pack Therapy in order to provide analgesia and reduce inflammation and edema. Treatment/Session Assessment:    · Response to Treatment:  Pt challenged w/ therex this session. He reported pain resolved following ice/compression. · Compliance with Program/Exercises: Will assess as treatment progresses. · Recommendations/Intent for next treatment session: \"Next visit will focus on advancements to more challenging activities\".   Variance from POC: none  PT Patient Time In/Time Out  Time In: 0925  Time Out: 72 [facility], PT

## 2017-09-25 ENCOUNTER — HOSPITAL ENCOUNTER (OUTPATIENT)
Dept: PHYSICAL THERAPY | Age: 52
Discharge: HOME | End: 2017-09-25
Payer: MEDICARE

## 2017-09-25 PROCEDURE — 97016 VASOPNEUMATIC DEVICE THERAPY: CPT

## 2017-09-25 PROCEDURE — 97110 THERAPEUTIC EXERCISES: CPT

## 2017-09-25 NOTE — PROGRESS NOTES
Jenelle Howard  : 1965 Therapy Center at [facility]  [address], [address], [place] [address]  [phone]   [phone]         OUTPATIENT PHYSICAL THERAPY:Daily Note 2017    ICD-10: Treatment Diagnosis: Pain in right ankle and joints of right foot (M25.571); Stiffness of right ankle, not elsewhere classified (M25.671)  Precautions/Allergies:   Penicillins   Fall Risk Score: 1 (? 5 = High Risk)  MD Orders: ROM, strengthening, WBAT MEDICAL/REFERRING DIAGNOSIS:  s/p hardware removal right ankle   DATE OF ONSET: Sx on 17  REFERRING PHYSICIAN: Phoebe Smith MD  RETURN PHYSICIAN APPOINTMENT: 10/2/17     INITIAL ASSESSMENT:  Mr. Kely iHnson presents to PT eval s/p hardware removal of R ankle. He had screws placed in his ankle from a work related injury back in , and they had begun to bother him and cause him a lot of pain. Today, he displayed decreased balance, R ankle ROM, and R ankle strength. He will benefit from continued skilled PT services to improve his deficits listed below and to progress towards his PLOF. PROBLEM LIST (Impacting functional limitations):  1. Decreased Strength  2. Decreased ADL/Functional Activities  3. Decreased Transfer Abilities  4. Decreased Ambulation Ability/Technique  5. Decreased Balance  6. Increased Pain  7. Decreased Flexibility/Joint Mobility  8. Edema/Girth  9. Decreased [employer] with Home Exercise Program INTERVENTIONS PLANNED:  1. Balance Exercise  2. Cold  3. Electrical Stimulation  4. Gait Training  5. Home Exercise Program (HEP)  6. Manual Therapy  7. Range of Motion (ROM)  8. Therapeutic Activites  9. Therapeutic Exercise/Strengthening  10. Transcutaneous Electrical Nerve Stimulation (TENS)   TREATMENT PLAN:  Effective Dates: 17 TO 18. Frequency/Duration: 2 times a week for 3 weeks  GOALS: (Goals have been discussed and agreed upon with patient.)  Discharge Goals: Time Frame: 3 weeks  1.  Pt will be independent w/ his HEP in order to decrease pain and increase functional mobility. 2. Pt will have R ankle ROM equal to that of his L ankle ROM in order to increase functional mobility. 3. Pt will have R ankle strength equal to that of his L ankle in order to increase his functional mobility. 4. Pt will have FAAM score of 80 or more in order to report an increase in functional mobility and a decrease in pain. Rehabilitation Potential For Stated Goals: Good  Regarding Grey Puckett's therapy, I certify that the treatment plan above will be carried out by a therapist or under their direction. Thank you for this referral,  Donn Mukherjee, PT                 The information in this section was collected on 9/18/17 (except where otherwise noted). HISTORY:   History of Present Injury/Illness (Reason for Referral):  Pt injured at work in April 2012 - at work helping a coworker pull down a pipe when it fell down on his L shoulder and R ankle. He is now on disability from that accident. He had screws placed in his R ankle in 2012, and they were removed on 9/1/17. He is WBAT. Past Medical History/Comorbidities:   Mr. Loibto Boudreaux  has a past medical history of Acromioclavicular joint arthritis; Allergic rhinitis (8/11/2016); Arrhythmia; Arthritis; Cervical radiculopathy (8/11/2016); Chronic pain; [facility] (08/11/2016); Deficiency, lipoprotein (8/11/2016); Depression (8/11/2016); Disorders of bursae and tendons in shoulder region, unspecified (12/23/2011); Dyslipidemia (8/11/2016); Dyspnea (8/11/2016); ED (erectile dysfunction) (8/11/2016); Encounter for long-term (current) use of medications (8/11/2016); Encounter for prostate cancer screening (8/11/2016); Gout; Gout (8/11/2016); HTN (hypertension), benign (8/11/2016); Hypercholesterolemia; Hypertension; Insomnia; Leg pain (8/11/2016); Lumbar back pain (8/11/2016); Metabolic syndrome (6/06/5578);  Primary localized osteoarthrosis, shoulder region (11/10/2011); RA (rheumatoid arthritis) ([facility] [facility] Seasonal allergies; Shoulder pain (8/11/2016); Sprain and strain of other specified sites of shoulder and upper arm (11/10/2011); Superior glenoid labrum lesion (11/10/2011); Supraspinatus (muscle) (tendon) sprain (11/10/2011); and Unspecified adverse effect of anesthesia. He also has no past medical history of Adverse effect of anesthesia; COPD; Difficult intubation; Malignant hyperthermia due to anesthesia; Nausea & vomiting; or Pseudocholinesterase deficiency. Mr. Alvaro Diamond  has a past surgical history that includes upper arm/elbow surgery unlisted (1997); removal anal fistula,subcutaneous (2005); cervical fusion (2011); back surgery (2001); and shoulder arthroscopy (11/2011). Social History/Living Environment:     Disabled, lives alone in mobile home  Prior Level of Function/Work/Activity:  Disabled, able to perform yard work as needed   Dominant Side:         RIGHT    Current Medications:       Current Outpatient Prescriptions:     multivitamin (ONE A DAY) tablet, Take 1 Tab by mouth daily. Stop seven days prior to surgery per anesthesia protocol., Disp: , Rfl:     omega 3-dha-epa-fish oil (FISH OIL) 100-160-1,000 mg cap, Take 1 Tab by mouth daily. Stop seven days prior to surgery per anesthesia protocol., Disp: , Rfl:     HYDROcodone-acetaminophen (NORCO)  mg tablet, Take 1 Tab by mouth every six (6) hours. Max Daily Amount: 4 Tabs. Indications: Pain (Patient taking differently: Take 1 Tab by mouth every six (6) hours. Take day of surgery per anesthesia protocol. Indications: Pain), Disp: 105 Tab, Rfl: 0    metoprolol tartrate (LOPRESSOR) 50 mg tablet, Take 1 Tab by mouth two (2) times a day. (Patient taking differently: Take 50 mg by mouth two (2) times a day. Take day of surgery per anesthesia protocol.   Indications: heart rate control), Disp: 180 Tab, Rfl: 3    lisinopril (PRINIVIL, ZESTRIL) 20 mg tablet, Take 1 Tab by mouth every morning. Takes in AM (Patient taking differently: Take 20 mg by mouth every morning. Takes in AM  Indications: hypertension), Disp: 90 Tab, Rfl: 3    indomethacin (INDOCIN) 25 mg capsule, Take 1 Cap by mouth three (3) times daily. (Patient taking differently: Take 25 mg by mouth three (3) times daily. Stop 5 days prior to surgery per anesthesia protocol. Indications: GOUT), Disp: 30 Cap, Rfl: 5    allopurinol (ZYLOPRIM) 300 mg tablet, Take 1 Tab by mouth daily. Indications: GOUT (Patient taking differently: Take 300 mg by mouth every morning. Take day of surgery per anesthesia protocol. Indications: GOUT), Disp: 90 Tab, Rfl: 3    montelukast (SINGULAIR) 10 mg tablet, Take 1 Tab by mouth daily. , Disp: 30 Tab, Rfl: 11   Date Last Reviewed:  9/25/2017    EXAMINATION:   Observation/Orthostatic Postural Assessment:          Rounded shoulders in sit and stand  Palpation:          Tender along incisions and malleoli   Girth:        58 cm on L, 59.5 cm on R - figure 8 test        28 cm L, 30 cm R - circumference of malleoli  Functional Mobility:         Gait/Ambulation:  Independent, minimal antalgic gait         Transfers:  Independent, no UE assist needed   Balance:          Decreased SLS on B LEs w/ R worse than L  Sensation:        Intact to B LEs w/ light touch, some numbness around incision     Joint/Muscle ROM Strength    DF 7* on L, 4* on R L 5/5, R 4/5    PF 60* on L, 55* on R L 5/5, R 4/5    Inversion 35* on L, 28* on R L 5/5, R 4/5    Eversion 22* on L, 20* on R L 5/5, R 4/5         Body Structures Involved:  1. Bones  2. Joints  3. Muscles Body Functions Affected:  1. Sensory/Pain  2. Neuromusculoskeletal  3. Movement Related Activities and Participation Affected:  1. General Tasks and Demands  2. Mobility  3. Domestic Life  4. Interpersonal Interactions and Relationships  5.  Community, Social and Civic Life   CLINICAL PRESENTATION:   CLINICAL DECISION MAKING:   Outcome Measure: Tool Used: PT/OT FOOT AND ANKLE ABILITY MEASURE  Score:  Initial: 77/84 Most Recent: X (Date: -- )   Interpretation of Score: For the \"Activities of Daily Living\", there are 21 questions each scored on a 5 point scale with 0 representing \"Unable to do\" and 4 representing \"No difficulty\". The lower the score, the greater the functional disability. 84/84 represents no disability. Minimal detectable change is 5.7 points. With the addition of the 8 questions in the \"Sports Subscale,\" there are 29 questions, each scored on a 5 point scale with 0 representing \"Unable to do\" and 4 representing \"No difficulty\". The lower the score, the greater the functional disability. 116/116 represents no disability. Minimal detectable change is 12.3 points. Activities of Daily Living:  Score 84 83-68 67-51 50-34 33-18 17-1 0   Modifier CH CI CJ CK CL CM CN     Activities of Daily Living + Sports Subscale:  Score 116 115-94 93-71 70-47 46-24 23-1 0   Modifier CH CI CJ CK CL CM CN     ? Mobility - Walking and Moving Around:     - CURRENT STATUS: CI - 1%-19% impaired, limited or restricted    - GOAL STATUS: CI - 1%-19% impaired, limited or restricted    - D/C STATUS:  ---------------To be determined---------------      Medical Necessity:   · Patient is expected to demonstrate progress in strength, range of motion and balance to increase independence with functional tasks. Reason for Services/Other Comments:  · Patient continues to demonstrate capacity to improve strength, ROM, and mobility which will increase independence. TREATMENT:   (In addition to Assessment/Re-Assessment sessions the following treatments were rendered)  Pre-treatment Symptoms/Complaints:  Pt reports he was sore after he went out on his boat the other day. Pain: Initial:     3/10 Post Session:  1/10     THERAPEUTIC EXERCISE: (30 minutes):  Exercises per grid below to improve mobility, strength and balance.   Required minimal verbal and manual cues to promote proper body alignment, promote proper body posture and promote proper body mechanics. Progressed resistance, range, repetitions and complexity of movement as indicated. Date:  9/18/17 Date:  9/20/17 Date:  9/25/17   Activity/Exercise Parameters Parameters Parameters   HEP Issued     Calf stretch 3x30 sec w/ belt     Inversion 20x, YTB 30x, YTB 30x, YTB   Eversion 20x, YTB 30x, YTB 30x, YTB   DF 20x, YTB 30x, YTB 30x, YTB   PF 20x, YTB 30x, YTB 30x, YTB   Bike/nustep  6 mins, bike 10 mins, level 1, NS   Wobble board  30x forward back, side to side, rotation 30x forward/back, side/side, rotation   Toe curls  10x    Calf stretch  3x30 sec on wedge 3x30 sec on wedge   Soleus stretch  3x30 sec on wedge 3x30 sec on wedge   Step downs  30x on 3 inch step 30x on 5 inch step   Step ups  20x on 8 inch step 30x on 8 inch step   SLS star balance   20x 3 different positions standing on R LE in // 20x3 different positions standing on R LE in //    Heel taps  1 inch step, 20x    Balance  Tandem  Tandem on foam, SLS on ground, 1 foot on stool throwing ball     MODALITIES: (15 minutes):      *  Cold Pack Therapy in order to provide analgesia and reduce inflammation and edema. Treatment/Session Assessment:    · Response to Treatment:  Pt challenged w/ balance exercises and ankle strengthening exercises. He is progressing towards his goals. · Compliance with Program/Exercises: Will assess as treatment progresses. · Recommendations/Intent for next treatment session: \"Next visit will focus on advancements to more challenging activities\".   Variance from POC: none  PT Patient Time In/Time Out  Time In: 6547  Time Out: 200 [facility],

## 2017-09-27 ENCOUNTER — HOSPITAL ENCOUNTER (OUTPATIENT)
Dept: PHYSICAL THERAPY | Age: 52
Discharge: HOME | End: 2017-09-27
Payer: MEDICARE

## 2017-09-27 PROCEDURE — 97016 VASOPNEUMATIC DEVICE THERAPY: CPT

## 2017-09-27 PROCEDURE — 97110 THERAPEUTIC EXERCISES: CPT

## 2017-09-27 NOTE — PROGRESS NOTES
Scarlett Vandalia  : 1965 [facility] at [facility]  [address], [address], [place]  [phone]   [phone]         OUTPATIENT PHYSICAL THERAPY:Daily Note 2017    ICD-10: Treatment Diagnosis: Pain in right ankle and joints of right foot (M25.571); Stiffness of right ankle, not elsewhere classified (M25.671)  Precautions/Allergies:   Penicillins   Fall Risk Score: 1 (? 5 = High Risk)  MD Orders: ROM, strengthening, WBAT MEDICAL/REFERRING DIAGNOSIS:  s/p hardware removal right ankle   DATE OF ONSET: Sx on 17  REFERRING PHYSICIAN: Felipa Joe MD  RETURN PHYSICIAN APPOINTMENT: 10/2/17     INITIAL ASSESSMENT:  Mr. Genet Meadows presents to PT eval s/p hardware removal of R ankle. He had screws placed in his ankle from a work related injury back in , and they had begun to bother him and cause him a lot of pain. Today, he displayed decreased balance, R ankle ROM, and R ankle strength. He will benefit from continued skilled PT services to improve his deficits listed below and to progress towards his PLOF. PROBLEM LIST (Impacting functional limitations):  1. Decreased Strength  2. Decreased ADL/Functional Activities  3. Decreased Transfer Abilities  4. Decreased Ambulation Ability/Technique  5. Decreased Balance  6. Increased Pain  7. Decreased Flexibility/Joint Mobility  8. Edema/Girth  9. Decreased [place] with Home Exercise Program INTERVENTIONS PLANNED:  1. Balance Exercise  2. Cold  3. Electrical Stimulation  4. Gait Training  5. Home Exercise Program (HEP)  6. Manual Therapy  7. Range of Motion (ROM)  8. Therapeutic Activites  9. Therapeutic Exercise/Strengthening  10. Transcutaneous Electrical Nerve Stimulation (TENS)   TREATMENT PLAN:  Effective Dates: 17 TO 18. Frequency/Duration: 2 times a week for 3 weeks  GOALS: (Goals have been discussed and agreed upon with patient.)  Discharge Goals: Time Frame: 3 weeks  1.  Pt will be independent w/ his HEP in order to decrease pain and increase functional mobility. 2. Pt will have R ankle ROM equal to that of his L ankle ROM in order to increase functional mobility. 3. Pt will have R ankle strength equal to that of his L ankle in order to increase his functional mobility. 4. Pt will have FAAM score of 80 or more in order to report an increase in functional mobility and a decrease in pain. Rehabilitation Potential For Stated Goals: Good  Regarding Jenny Shiela Puckett's therapy, I certify that the treatment plan above will be carried out by a therapist or under their direction. Thank you for this referral,  Radha Davis, PT                 The information in this section was collected on 9/18/17 (except where otherwise noted). HISTORY:   History of Present Injury/Illness (Reason for Referral):  Pt injured at work in April 2012 - at work helping a coworker pull down a pipe when it fell down on his L shoulder and R ankle. He is now on disability from that accident. He had screws placed in his R ankle in 2012, and they were removed on 9/1/17. He is WBAT. Past Medical History/Comorbidities:   Mr. Maria Isabel Quarles  has a past medical history of Acromioclavicular joint arthritis; Allergic rhinitis (8/11/2016); Arrhythmia; Arthritis; Cervical radiculopathy (8/11/2016); Chronic pain; [facility] (08/11/2016); Deficiency, lipoprotein (8/11/2016); Depression (8/11/2016); Disorders of bursae and tendons in shoulder region, unspecified (12/23/2011); Dyslipidemia (8/11/2016); Dyspnea (8/11/2016); ED (erectile dysfunction) (8/11/2016); Encounter for long-term (current) use of medications (8/11/2016); Encounter for prostate cancer screening (8/11/2016); Gout; Gout (8/11/2016); HTN (hypertension), benign (8/11/2016); Hypercholesterolemia; Hypertension; Insomnia; Leg pain (8/11/2016); Lumbar back pain (8/11/2016); Metabolic syndrome (8/60/4156);  Primary localized osteoarthrosis, shoulder region (11/10/2011); RA (rheumatoid arthritis) ([facility] Seasonal allergies; Shoulder pain (8/11/2016); Sprain and strain of other specified sites of shoulder and upper arm (11/10/2011); Superior glenoid labrum lesion (11/10/2011); Supraspinatus (muscle) (tendon) sprain (11/10/2011); and Unspecified adverse effect of anesthesia. He also has no past medical history of Adverse effect of anesthesia; COPD; Difficult intubation; Malignant hyperthermia due to anesthesia; Nausea & vomiting; or Pseudocholinesterase deficiency. Mr. Yareli Mcpherson  has a past surgical history that includes upper arm/elbow surgery unlisted (1997); removal anal fistula,subcutaneous (2005); cervical fusion (2011); back surgery (2001); and shoulder arthroscopy (11/2011). Social History/Living Environment:     Disabled, lives alone in mobile home  Prior Level of Function/Work/Activity:  Disabled, able to perform yard work as needed   Dominant Side:         RIGHT    Current Medications:       Current Outpatient Prescriptions:     multivitamin (ONE A DAY) tablet, Take 1 Tab by mouth daily. Stop seven days prior to surgery per anesthesia protocol., Disp: , Rfl:     omega 3-dha-epa-fish oil (FISH OIL) 100-160-1,000 mg cap, Take 1 Tab by mouth daily. Stop seven days prior to surgery per anesthesia protocol., Disp: , Rfl:     HYDROcodone-acetaminophen (NORCO)  mg tablet, Take 1 Tab by mouth every six (6) hours. Max Daily Amount: 4 Tabs. Indications: Pain (Patient taking differently: Take 1 Tab by mouth every six (6) hours. Take day of surgery per anesthesia protocol. Indications: Pain), Disp: 105 Tab, Rfl: 0    metoprolol tartrate (LOPRESSOR) 50 mg tablet, Take 1 Tab by mouth two (2) times a day. (Patient taking differently: Take 50 mg by mouth two (2) times a day. Take day of surgery per anesthesia protocol.   Indications: heart rate control), Disp: 180 Tab, Rfl: 3    lisinopril (PRINIVIL, ZESTRIL) 20 mg tablet, Take 1 Tab by mouth every morning. Takes in AM (Patient taking differently: Take 20 mg by mouth every morning. Takes in AM  Indications: hypertension), Disp: 90 Tab, Rfl: 3    indomethacin (INDOCIN) 25 mg capsule, Take 1 Cap by mouth three (3) times daily. (Patient taking differently: Take 25 mg by mouth three (3) times daily. Stop 5 days prior to surgery per anesthesia protocol. Indications: GOUT), Disp: 30 Cap, Rfl: 5    allopurinol (ZYLOPRIM) 300 mg tablet, Take 1 Tab by mouth daily. Indications: GOUT (Patient taking differently: Take 300 mg by mouth every morning. Take day of surgery per anesthesia protocol. Indications: GOUT), Disp: 90 Tab, Rfl: 3    montelukast (SINGULAIR) 10 mg tablet, Take 1 Tab by mouth daily. , Disp: 30 Tab, Rfl: 11   Date Last Reviewed:  9/27/2017    EXAMINATION:   Observation/Orthostatic Postural Assessment:          Rounded shoulders in sit and stand  Palpation:          Tender along incisions and malleoli   Girth:        58 cm on L, 59.5 cm on R - figure 8 test        28 cm L, 30 cm R - circumference of malleoli  Functional Mobility:         Gait/Ambulation:  Independent, minimal antalgic gait         Transfers:  Independent, no UE assist needed   Balance:          Decreased SLS on B LEs w/ R worse than L  Sensation:        Intact to B LEs w/ light touch, some numbness around incision     Joint/Muscle ROM Strength    DF 7* on L, 4* on R L 5/5, R 4/5    PF 60* on L, 55* on R L 5/5, R 4/5    Inversion 35* on L, 28* on R L 5/5, R 4/5    Eversion 22* on L, 20* on R L 5/5, R 4/5         Body Structures Involved:  1. Bones  2. Joints  3. Muscles Body Functions Affected:  1. Sensory/Pain  2. Neuromusculoskeletal  3. Movement Related Activities and Participation Affected:  1. General Tasks and Demands  2. Mobility  3. Domestic Life  4. Interpersonal Interactions and Relationships  5.  Community, Social and Civic Life   CLINICAL PRESENTATION:   CLINICAL DECISION MAKING:   Outcome Measure: Tool Used: PT/OT FOOT AND ANKLE ABILITY MEASURE  Score:  Initial: 77/84 Most Recent: X (Date: -- )   Interpretation of Score: For the \"Activities of Daily Living\", there are 21 questions each scored on a 5 point scale with 0 representing \"Unable to do\" and 4 representing \"No difficulty\". The lower the score, the greater the functional disability. 84/84 represents no disability. Minimal detectable change is 5.7 points. With the addition of the 8 questions in the \"Sports Subscale,\" there are 29 questions, each scored on a 5 point scale with 0 representing \"Unable to do\" and 4 representing \"No difficulty\". The lower the score, the greater the functional disability. 116/116 represents no disability. Minimal detectable change is 12.3 points. Activities of Daily Living:  Score 84 83-68 67-51 50-34 33-18 17-1 0   Modifier CH CI CJ CK CL CM CN     Activities of Daily Living + Sports Subscale:  Score 116 115-94 93-71 70-47 46-24 23-1 0   Modifier CH CI CJ CK CL CM CN     ? Mobility - Walking and Moving Around:     - CURRENT STATUS: CI - 1%-19% impaired, limited or restricted    - GOAL STATUS: CI - 1%-19% impaired, limited or restricted    - D/C STATUS:  ---------------To be determined---------------      Medical Necessity:   · Patient is expected to demonstrate progress in strength, range of motion and balance to increase independence with functional tasks. Reason for Services/Other Comments:  · Patient continues to demonstrate capacity to improve strength, ROM, and mobility which will increase independence. TREATMENT:   (In addition to Assessment/Re-Assessment sessions the following treatments were rendered)  Pre-treatment Symptoms/Complaints:  Pt reports he is sore today because he did a lot on his feet. Pain: Initial:     3/10 Post Session:  0/10     THERAPEUTIC EXERCISE: (42 minutes):  Exercises per grid below to improve mobility, strength and balance.   Required minimal verbal and manual cues to promote proper body alignment, promote proper body posture and promote proper body mechanics. Progressed resistance, range, repetitions and complexity of movement as indicated. Date:  9/18/17 Date:  9/20/17 Date:  9/25/17 Date:  9/27/17   Activity/Exercise Parameters Parameters Parameters Parameters   HEP Issued      Calf stretch 3x30 sec w/ belt      Inversion 20x, YTB 30x, YTB 30x, YTB 30x, YTB   Eversion 20x, YTB 30x, YTB 30x, YTB 30x, YTB   DF 20x, YTB 30x, YTB 30x, YTB 30x, YTB   PF 20x, YTB 30x, YTB 30x, YTB 30x, YTB   Bike/nustep  6 mins, bike 10 mins, level 1, NS 10 mins, bike   Wobble board  30x forward back, side to side, rotation 30x forward/back, side/side, rotation 30 forward/back, side/side, rotation    Toe curls  10x     Calf stretch  3x30 sec on wedge 3x30 sec on wedge 3x30 sec on wedge   Soleus stretch  3x30 sec on wedge 3x30 sec on wedge 3x30 sec on wedge   Step downs  30x on 3 inch step 30x on 5 inch step 30x on 3 inch step   Step ups  20x on 8 inch step 30x on 8 inch step 30x on 6 inch step   SLS star balance   20x 3 different positions standing on R LE in // 20x3 different positions standing on R LE in //  20x 3 different positions standing on R LE in //   Heel taps  1 inch step, 20x     Balance  Tandem  Tandem on foam, SLS on ground, 1 foot on stool throwing ball Tandem on foam, SLS on ground, 1 foot on stool and 1 on foam throwing ball   Leg press     100#, 40x   Mini squats     10x a //     MODALITIES: (15 minutes):      *  Cold Pack Therapy in order to provide analgesia and reduce inflammation and edema. Treatment/Session Assessment:    · Response to Treatment:  Pt challenged w/ balance exercises and ankle strengthening exercises. He displayed some increase in balance and endurance this session. · Compliance with Program/Exercises: Will assess as treatment progresses. · Recommendations/Intent for next treatment session:  \"Next visit will focus on advancements to more challenging activities\".   Variance from POC: none  PT Patient Time In/Time Out  Time In: 0945  Time Out: [address]  Radha Davis, PT

## 2017-10-02 ENCOUNTER — HOSPITAL ENCOUNTER (OUTPATIENT)
Dept: PHYSICAL THERAPY | Age: 52
Discharge: HOME | End: 2017-10-02
Payer: MEDICARE

## 2017-10-02 PROCEDURE — 97016 VASOPNEUMATIC DEVICE THERAPY: CPT

## 2017-10-02 PROCEDURE — 97110 THERAPEUTIC EXERCISES: CPT

## 2017-10-02 NOTE — PROGRESS NOTES
Surekha Garrett  : 1965 [facility] at [facility]  [address], [address], [place]  [phone]   [phone]         OUTPATIENT PHYSICAL THERAPY:Daily Note 10/2/2017    ICD-10: Treatment Diagnosis: Pain in right ankle and joints of right foot (M25.571); Stiffness of right ankle, not elsewhere classified (M25.671)  Precautions/Allergies:   Penicillins   Fall Risk Score: 1 (? 5 = High Risk)  MD Orders: ROM, strengthening, WBAT MEDICAL/REFERRING DIAGNOSIS:  s/p hardware removal right ankle   DATE OF ONSET: Sx on 17  REFERRING PHYSICIAN: Abdulaziz Carolina MD  RETURN PHYSICIAN APPOINTMENT: 10/2/17     INITIAL ASSESSMENT:  Mr. Jack Chew presents to PT eval s/p hardware removal of R ankle. He had screws placed in his ankle from a work related injury back in , and they had begun to bother him and cause him a lot of pain. Today, he displayed decreased balance, R ankle ROM, and R ankle strength. He will benefit from continued skilled PT services to improve his deficits listed below and to progress towards his PLOF. PROBLEM LIST (Impacting functional limitations):  1. Decreased Strength  2. Decreased ADL/Functional Activities  3. Decreased Transfer Abilities  4. Decreased Ambulation Ability/Technique  5. Decreased Balance  6. Increased Pain  7. Decreased Flexibility/Joint Mobility  8. Edema/Girth  9. Decreased [place] with Home Exercise Program INTERVENTIONS PLANNED:  1. Balance Exercise  2. Cold  3. Electrical Stimulation  4. Gait Training  5. Home Exercise Program (HEP)  6. Manual Therapy  7. Range of Motion (ROM)  8. Therapeutic Activites  9. Therapeutic Exercise/Strengthening  10. Transcutaneous Electrical Nerve Stimulation (TENS)   TREATMENT PLAN:  Effective Dates: 17 TO 18. Frequency/Duration: 2 times a week for 3 weeks  GOALS: (Goals have been discussed and agreed upon with patient.)  Discharge Goals: Time Frame: 3 weeks  1.  Pt will be independent w/ his HEP in order to decrease pain and increase functional mobility. 2. Pt will have R ankle ROM equal to that of his L ankle ROM in order to increase functional mobility. 3. Pt will have R ankle strength equal to that of his L ankle in order to increase his functional mobility. 4. Pt will have FAAM score of 80 or more in order to report an increase in functional mobility and a decrease in pain. Rehabilitation Potential For Stated Goals: Good  Regarding Abhi Puckett's therapy, I certify that the treatment plan above will be carried out by a therapist or under their direction. Thank you for this referral,  Rebeka Renteria, PT                 The information in this section was collected on 9/18/17 (except where otherwise noted). HISTORY:   History of Present Injury/Illness (Reason for Referral):  Pt injured at work in April 2012 - at work helping a coworker pull down a pipe when it fell down on his L shoulder and R ankle. He is now on disability from that accident. He had screws placed in his R ankle in 2012, and they were removed on 9/1/17. He is WBAT. Past Medical History/Comorbidities:   Mr. Lani Phelps  has a past medical history of Acromioclavicular joint arthritis; Allergic rhinitis (8/11/2016); Arrhythmia; Arthritis; Cervical radiculopathy (8/11/2016); Chronic pain; [facility] (08/11/2016); Deficiency, lipoprotein (8/11/2016); Depression (8/11/2016); Disorders of bursae and tendons in shoulder region, unspecified (12/23/2011); Dyslipidemia (8/11/2016); Dyspnea (8/11/2016); ED (erectile dysfunction) (8/11/2016); Encounter for long-term (current) use of medications (8/11/2016); Encounter for prostate cancer screening (8/11/2016); Gout; Gout (8/11/2016); HTN (hypertension), benign (8/11/2016); Hypercholesterolemia; Hypertension; Insomnia; Leg pain (8/11/2016); Lumbar back pain (8/11/2016); Metabolic syndrome (1/05/1644);  Primary localized osteoarthrosis, shoulder region (11/10/2011); RA (rheumatoid arthritis) ([facility] Seasonal allergies; Shoulder pain (8/11/2016); Sprain and strain of other specified sites of shoulder and upper arm (11/10/2011); Superior glenoid labrum lesion (11/10/2011); Supraspinatus (muscle) (tendon) sprain (11/10/2011); and Unspecified adverse effect of anesthesia. He also has no past medical history of Adverse effect of anesthesia; COPD; Difficult intubation; Malignant hyperthermia due to anesthesia; Nausea & vomiting; or Pseudocholinesterase deficiency. Mr. Miles Torres  has a past surgical history that includes upper arm/elbow surgery unlisted (1997); removal anal fistula,subcutaneous (2005); cervical fusion (2011); back surgery (2001); and shoulder arthroscopy (11/2011). Social History/Living Environment:     Disabled, lives alone in mobile home  Prior Level of Function/Work/Activity:  Disabled, able to perform yard work as needed   Dominant Side:         RIGHT    Current Medications:       Current Outpatient Prescriptions:     multivitamin (ONE A DAY) tablet, Take 1 Tab by mouth daily. Stop seven days prior to surgery per anesthesia protocol., Disp: , Rfl:     omega 3-dha-epa-fish oil (FISH OIL) 100-160-1,000 mg cap, Take 1 Tab by mouth daily. Stop seven days prior to surgery per anesthesia protocol., Disp: , Rfl:     HYDROcodone-acetaminophen (NORCO)  mg tablet, Take 1 Tab by mouth every six (6) hours. Max Daily Amount: 4 Tabs. Indications: Pain (Patient taking differently: Take 1 Tab by mouth every six (6) hours. Take day of surgery per anesthesia protocol. Indications: Pain), Disp: 105 Tab, Rfl: 0    metoprolol tartrate (LOPRESSOR) 50 mg tablet, Take 1 Tab by mouth two (2) times a day. (Patient taking differently: Take 50 mg by mouth two (2) times a day. Take day of surgery per anesthesia protocol.   Indications: heart rate control), Disp: 180 Tab, Rfl: 3    lisinopril (PRINIVIL, ZESTRIL) 20 mg tablet, Take 1 Tab by mouth every morning. Takes in AM (Patient taking differently: Take 20 mg by mouth every morning. Takes in AM  Indications: hypertension), Disp: 90 Tab, Rfl: 3    indomethacin (INDOCIN) 25 mg capsule, Take 1 Cap by mouth three (3) times daily. (Patient taking differently: Take 25 mg by mouth three (3) times daily. Stop 5 days prior to surgery per anesthesia protocol. Indications: GOUT), Disp: 30 Cap, Rfl: 5    allopurinol (ZYLOPRIM) 300 mg tablet, Take 1 Tab by mouth daily. Indications: GOUT (Patient taking differently: Take 300 mg by mouth every morning. Take day of surgery per anesthesia protocol. Indications: GOUT), Disp: 90 Tab, Rfl: 3    montelukast (SINGULAIR) 10 mg tablet, Take 1 Tab by mouth daily. , Disp: 30 Tab, Rfl: 11   Date Last Reviewed:  10/2/2017    EXAMINATION:   Observation/Orthostatic Postural Assessment:          Rounded shoulders in sit and stand  Palpation:          Tender along incisions and malleoli   Girth:        58 cm on L, 59.5 cm on R - figure 8 test        28 cm L, 30 cm R - circumference of malleoli  Functional Mobility:         Gait/Ambulation:  Independent, minimal antalgic gait         Transfers:  Independent, no UE assist needed   Balance:          Decreased SLS on B LEs w/ R worse than L  Sensation:        Intact to B LEs w/ light touch, some numbness around incision     Joint/Muscle ROM Strength    DF 7* on L, 4* on R L 5/5, R 4/5    PF 60* on L, 55* on R L 5/5, R 4/5    Inversion 35* on L, 28* on R L 5/5, R 4/5    Eversion 22* on L, 20* on R L 5/5, R 4/5         Body Structures Involved:  1. Bones  2. Joints  3. Muscles Body Functions Affected:  1. Sensory/Pain  2. Neuromusculoskeletal  3. Movement Related Activities and Participation Affected:  1. General Tasks and Demands  2. Mobility  3. Domestic Life  4. Interpersonal Interactions and Relationships  5.  Community, Social and Civic Life   CLINICAL PRESENTATION:   CLINICAL DECISION MAKING:   Outcome Measure: Tool Used: PT/OT FOOT AND ANKLE ABILITY MEASURE  Score:  Initial: 77/84 Most Recent: X (Date: -- )   Interpretation of Score: For the \"Activities of Daily Living\", there are 21 questions each scored on a 5 point scale with 0 representing \"Unable to do\" and 4 representing \"No difficulty\". The lower the score, the greater the functional disability. 84/84 represents no disability. Minimal detectable change is 5.7 points. With the addition of the 8 questions in the \"Sports Subscale,\" there are 29 questions, each scored on a 5 point scale with 0 representing \"Unable to do\" and 4 representing \"No difficulty\". The lower the score, the greater the functional disability. 116/116 represents no disability. Minimal detectable change is 12.3 points. Activities of Daily Living:  Score 84 83-68 67-51 50-34 33-18 17-1 0   Modifier CH CI CJ CK CL CM CN     Activities of Daily Living + Sports Subscale:  Score 116 115-94 93-71 70-47 46-24 23-1 0   Modifier CH CI CJ CK CL CM CN     ? Mobility - Walking and Moving Around:     - CURRENT STATUS: CI - 1%-19% impaired, limited or restricted    - GOAL STATUS: CI - 1%-19% impaired, limited or restricted    - D/C STATUS:  ---------------To be determined---------------      Medical Necessity:   · Patient is expected to demonstrate progress in strength, range of motion and balance to increase independence with functional tasks. Reason for Services/Other Comments:  · Patient continues to demonstrate capacity to improve strength, ROM, and mobility which will increase independence. TREATMENT:   (In addition to Assessment/Re-Assessment sessions the following treatments were rendered)  Pre-treatment Symptoms/Complaints:  Pt reports he is sore today because he did a lot on his feet. Pain: Initial:     3/10 Post Session:  0/10     THERAPEUTIC EXERCISE: (35 minutes):  Exercises per grid below to improve mobility, strength and balance.   Required minimal verbal and manual cues to promote proper body alignment, promote proper body posture and promote proper body mechanics. Progressed resistance, range, repetitions and complexity of movement as indicated. Date:  9/18/17 Date:  9/20/17 Date:  9/25/17 Date:  9/27/17 Date:  10/2/17   Activity/Exercise Parameters Parameters Parameters Parameters Parameters   HEP Issued       Calf stretch 3x30 sec w/ belt       Inversion 20x, YTB 30x, YTB 30x, YTB 30x, YTB 30x, YTB   Eversion 20x, YTB 30x, YTB 30x, YTB 30x, YTB 30x, YTB   DF 20x, YTB 30x, YTB 30x, YTB 30x, YTB 30x, YTB   PF 20x, YTB 30x, YTB 30x, YTB 30x, YTB 30x, YTB   Bike/nustep  6 mins, bike 10 mins, level 1, NS 10 mins, bike 10 mins, bike   Wobble board  30x forward back, side to side, rotation 30x forward/back, side/side, rotation 30 forward/back, side/side, rotation  30 forward/back, side/side, rotation   Toe curls  10x      Calf stretch  3x30 sec on wedge 3x30 sec on wedge 3x30 sec on wedge 3x30 sec on wedge   Soleus stretch  3x30 sec on wedge 3x30 sec on wedge 3x30 sec on wedge 3x30 sec on wedge   Step downs  30x on 3 inch step 30x on 5 inch step 30x on 3 inch step 30x, 3 inch step   Step ups  20x on 8 inch step 30x on 8 inch step 30x on 6 inch step 30x on 6 inch step   SLS star balance   20x 3 different positions standing on R LE in // 20x3 different positions standing on R LE in //  20x 3 different positions standing on R LE in // 20x3 different positions standing on R LE in //   Heel taps  1 inch step, 20x      Balance  Tandem  Tandem on foam, SLS on ground, 1 foot on stool throwing ball Tandem on foam, SLS on ground, 1 foot on stool and 1 on foam throwing ball SLS on R LE in //   Leg press     100#, 40x 100#, 30x   Mini squats     10x a //      MODALITIES: (10 minutes):      *  Cold Pack Therapy in order to provide analgesia and reduce inflammation and edema.      Treatment/Session Assessment:    · Response to Treatment:  Pt challenged w/ balance exercises and ankle strengthening exercises. He is progressing towards his goals. · Compliance with Program/Exercises: Will assess as treatment progresses. · Recommendations/Intent for next treatment session: \"Next visit will focus on advancements to more challenging activities\".   Variance from POC: none  PT Patient Time In/Time Out  Time In: 1430  Time Out: [address] [facility],

## 2017-10-04 ENCOUNTER — HOSPITAL ENCOUNTER (OUTPATIENT)
Dept: PHYSICAL THERAPY | Age: 52
Discharge: HOME | End: 2017-10-04
Payer: MEDICARE

## 2017-10-04 PROCEDURE — 97140 MANUAL THERAPY 1/> REGIONS: CPT

## 2017-10-04 PROCEDURE — 97110 THERAPEUTIC EXERCISES: CPT

## 2017-10-04 NOTE — PROGRESS NOTES
Jordan Valencia  : 1965 [facility]  [address], [address], [place]  [phone]   [phone]         OUTPATIENT PHYSICAL THERAPY:Daily Note 10/4/2017    ICD-10: Treatment Diagnosis: Pain in right ankle and joints of right foot (M25.571); Stiffness of right ankle, not elsewhere classified (M25.671)  Precautions/Allergies:   Penicillins   Fall Risk Score: 1 (? 5 = High Risk)  MD Orders: ROM, strengthening, WBAT MEDICAL/REFERRING DIAGNOSIS:  s/p hardware removal right ankle   DATE OF ONSET: Sx on 17  REFERRING PHYSICIAN: Enzo Alexander MD  RETURN PHYSICIAN APPOINTMENT: 10/2/17     INITIAL ASSESSMENT:  Mr. Funmi Landon presents to PT eval s/p hardware removal of R ankle. He had screws placed in his ankle from a work related injury back in , and they had begun to bother him and cause him a lot of pain. Today, he displayed decreased balance, R ankle ROM, and R ankle strength. He will benefit from continued skilled PT services to improve his deficits listed below and to progress towards his PLOF. PROBLEM LIST (Impacting functional limitations):  1. Decreased Strength  2. Decreased ADL/Functional Activities  3. Decreased Transfer Abilities  4. Decreased Ambulation Ability/Technique  5. Decreased Balance  6. Increased Pain  7. Decreased Flexibility/Joint Mobility  8. Edema/Girth  9. Decreased [place] with Home Exercise Program INTERVENTIONS PLANNED:  1. Balance Exercise  2. Cold  3. Electrical Stimulation  4. Gait Training  5. Home Exercise Program (HEP)  6. Manual Therapy  7. Range of Motion (ROM)  8. Therapeutic Activites  9. Therapeutic Exercise/Strengthening  10. Transcutaneous Electrical Nerve Stimulation (TENS)   TREATMENT PLAN:  Effective Dates: 17 TO 18. Frequency/Duration: 2 times a week for 3 weeks  GOALS: (Goals have been discussed and agreed upon with patient.)  Discharge Goals: Time Frame: 3 weeks  1.  Pt will be independent w/ his HEP in order to decrease pain and increase functional mobility. 2. Pt will have R ankle ROM equal to that of his L ankle ROM in order to increase functional mobility. 3. Pt will have R ankle strength equal to that of his L ankle in order to increase his functional mobility. 4. Pt will have FAAM score of 80 or more in order to report an increase in functional mobility and a decrease in pain. Rehabilitation Potential For Stated Goals: Good  Regarding Lisa Puckett's therapy, I certify that the treatment plan above will be carried out by a therapist or under their direction. Thank you for this referral,  Gerhardt Poche, PT                 The information in this section was collected on 9/18/17 (except where otherwise noted). HISTORY:   History of Present Injury/Illness (Reason for Referral):  Pt injured at work in April 2012 - at work helping a coworker pull down a pipe when it fell down on his L shoulder and R ankle. He is now on disability from that accident. He had screws placed in his R ankle in 2012, and they were removed on 9/1/17. He is WBAT. Past Medical History/Comorbidities:   Mr. Miles Torres  has a past medical history of Acromioclavicular joint arthritis; Allergic rhinitis (8/11/2016); Arrhythmia; Arthritis; Cervical radiculopathy (8/11/2016); Chronic pain; COPD (chronic obstructive pulmonary disease) ([facility] (08/11/2016); Deficiency, lipoprotein (8/11/2016); Depression (8/11/2016); Disorders of bursae and tendons in shoulder region, unspecified (12/23/2011); Dyslipidemia (8/11/2016); Dyspnea (8/11/2016); ED (erectile dysfunction) (8/11/2016); Encounter for long-term (current) use of medications (8/11/2016); Encounter for prostate cancer screening (8/11/2016); Gout; Gout (8/11/2016); HTN (hypertension), benign (8/11/2016); Hypercholesterolemia; Hypertension; Insomnia; Leg pain (8/11/2016); Lumbar back pain (8/11/2016); Metabolic syndrome (3/33/6126);  Primary localized osteoarthrosis, shoulder region (11/10/2011); RA (rheumatoid arthritis) ([address] Seasonal allergies; Shoulder pain (8/11/2016); Sprain and strain of other specified sites of shoulder and upper arm (11/10/2011); Superior glenoid labrum lesion (11/10/2011); Supraspinatus (muscle) (tendon) sprain (11/10/2011); and Unspecified adverse effect of anesthesia. He also has no past medical history of Adverse effect of anesthesia; COPD; Difficult intubation; Malignant hyperthermia due to anesthesia; Nausea & vomiting; or Pseudocholinesterase deficiency. Mr. Josh Caraballo  has a past surgical history that includes upper arm/elbow surgery unlisted (1997); removal anal fistula,subcutaneous (2005); cervical fusion (2011); back surgery (2001); and shoulder arthroscopy (11/2011). Social History/Living Environment:     Disabled, lives alone in mobile home  Prior Level of Function/Work/Activity:  Disabled, able to perform yard work as needed   Dominant Side:         RIGHT    Current Medications:       Current Outpatient Prescriptions:     multivitamin (ONE A DAY) tablet, Take 1 Tab by mouth daily. Stop seven days prior to surgery per anesthesia protocol., Disp: , Rfl:     omega 3-dha-epa-fish oil (FISH OIL) 100-160-1,000 mg cap, Take 1 Tab by mouth daily. Stop seven days prior to surgery per anesthesia protocol., Disp: , Rfl:     HYDROcodone-acetaminophen (NORCO)  mg tablet, Take 1 Tab by mouth every six (6) hours. Max Daily Amount: 4 Tabs. Indications: Pain (Patient taking differently: Take 1 Tab by mouth every six (6) hours. Take day of surgery per anesthesia protocol. Indications: Pain), Disp: 105 Tab, Rfl: 0    metoprolol tartrate (LOPRESSOR) 50 mg tablet, Take 1 Tab by mouth two (2) times a day. (Patient taking differently: Take 50 mg by mouth two (2) times a day. Take day of surgery per anesthesia protocol.   Indications: heart rate control), Disp: 180 Tab, Rfl: 3    lisinopril (PRINIVIL, ZESTRIL) 20 mg tablet, Take 1 Tab by mouth every morning. Takes in AM (Patient taking differently: Take 20 mg by mouth every morning. Takes in AM  Indications: hypertension), Disp: 90 Tab, Rfl: 3    indomethacin (INDOCIN) 25 mg capsule, Take 1 Cap by mouth three (3) times daily. (Patient taking differently: Take 25 mg by mouth three (3) times daily. Stop 5 days prior to surgery per anesthesia protocol. Indications: GOUT), Disp: 30 Cap, Rfl: 5    allopurinol (ZYLOPRIM) 300 mg tablet, Take 1 Tab by mouth daily. Indications: GOUT (Patient taking differently: Take 300 mg by mouth every morning. Take day of surgery per anesthesia protocol. Indications: GOUT), Disp: 90 Tab, Rfl: 3    montelukast (SINGULAIR) 10 mg tablet, Take 1 Tab by mouth daily. , Disp: 30 Tab, Rfl: 11   Date Last Reviewed:  10/4/2017    EXAMINATION:   Observation/Orthostatic Postural Assessment:          Rounded shoulders in sit and stand  Palpation:          Tender along incisions and malleoli   Girth:        58 cm on L, 59.5 cm on R - figure 8 test        28 cm L, 30 cm R - circumference of malleoli  Functional Mobility:         Gait/Ambulation:  Independent, minimal antalgic gait         Transfers:  Independent, no UE assist needed   Balance:          Decreased SLS on B LEs w/ R worse than L  Sensation:        Intact to B LEs w/ light touch, some numbness around incision     Joint/Muscle ROM Strength    DF 7* on L, 4* on R L 5/5, R 4/5    PF 60* on L, 55* on R L 5/5, R 4/5    Inversion 35* on L, 28* on R L 5/5, R 4/5    Eversion 22* on L, 20* on R L 5/5, R 4/5         Body Structures Involved:  1. Bones  2. Joints  3. Muscles Body Functions Affected:  1. Sensory/Pain  2. Neuromusculoskeletal  3. Movement Related Activities and Participation Affected:  1. General Tasks and Demands  2. Mobility  3. Domestic Life  4. Interpersonal Interactions and Relationships  5.  Community, Social and Civic Life   CLINICAL PRESENTATION:   CLINICAL DECISION MAKING:   Outcome Measure: Tool Used: PT/OT FOOT AND ANKLE ABILITY MEASURE  Score:  Initial: 77/84 Most Recent: X (Date: -- )   Interpretation of Score: For the \"Activities of Daily Living\", there are 21 questions each scored on a 5 point scale with 0 representing \"Unable to do\" and 4 representing \"No difficulty\". The lower the score, the greater the functional disability. 84/84 represents no disability. Minimal detectable change is 5.7 points. With the addition of the 8 questions in the \"Sports Subscale,\" there are 29 questions, each scored on a 5 point scale with 0 representing \"Unable to do\" and 4 representing \"No difficulty\". The lower the score, the greater the functional disability. 116/116 represents no disability. Minimal detectable change is 12.3 points. Activities of Daily Living:  Score 84 83-68 67-51 50-34 33-18 17-1 0   Modifier CH CI CJ CK CL CM CN     Activities of Daily Living + Sports Subscale:  Score 116 115-94 93-71 70-47 46-24 23-1 0   Modifier CH CI CJ CK CL CM CN     ? Mobility - Walking and Moving Around:     - CURRENT STATUS: CI - 1%-19% impaired, limited or restricted    - GOAL STATUS: CI - 1%-19% impaired, limited or restricted    - D/C STATUS:  ---------------To be determined---------------      Medical Necessity:   · Patient is expected to demonstrate progress in strength, range of motion and balance to increase independence with functional tasks. Reason for Services/Other Comments:  · Patient continues to demonstrate capacity to improve strength, ROM, and mobility which will increase independence. TREATMENT:   (In addition to Assessment/Re-Assessment sessions the following treatments were rendered)  Pre-treatment Symptoms/Complaints:  Pt reports he was on his feet all day yesterday. Pain: Initial:     2/10 Post Session:  0/10     THERAPEUTIC EXERCISE: (38 minutes):  Exercises per grid below to improve mobility, strength and balance.   Required minimal verbal and manual cues to promote proper body alignment, promote proper body posture and promote proper body mechanics. Progressed resistance, range, repetitions and complexity of movement as indicated.      Date:  9/18/17 Date:  9/20/17 Date:  9/25/17 Date:  9/27/17 Date:  10/2/17 Date:  10/4/17   Activity/Exercise Parameters Parameters Parameters Parameters Parameters    HEP Issued        Calf stretch 3x30 sec w/ belt        Inversion 20x, YTB 30x, YTB 30x, YTB 30x, YTB 30x, YTB    Eversion 20x, YTB 30x, YTB 30x, YTB 30x, YTB 30x, YTB    DF 20x, YTB 30x, YTB 30x, YTB 30x, YTB 30x, YTB    PF 20x, YTB 30x, YTB 30x, YTB 30x, YTB 30x, YTB    Bike/nustep  6 mins, bike 10 mins, level 1, NS 10 mins, bike 10 mins, bike 10 mins, bike   Wobble board  30x forward back, side to side, rotation 30x forward/back, side/side, rotation 30 forward/back, side/side, rotation  30 forward/back, side/side, rotation 30 forward/back, side/side, and rotation in stand    Toe curls  10x       Calf stretch  3x30 sec on wedge 3x30 sec on wedge 3x30 sec on wedge 3x30 sec on wedge 3x30 sec on wedge   Soleus stretch  3x30 sec on wedge 3x30 sec on wedge 3x30 sec on wedge 3x30 sec on wedge 3x30 sec on wedge    Step downs  30x on 3 inch step 30x on 5 inch step 30x on 3 inch step 30x, 3 inch step 30x, 3 inch step   Step ups  20x on 8 inch step 30x on 8 inch step 30x on 6 inch step 30x on 6 inch step 30x 8 inch step   SLS star balance   20x 3 different positions standing on R LE in // 20x3 different positions standing on R LE in //  20x 3 different positions standing on R LE in // 20x3 different positions standing on R LE in //    Heel taps  1 inch step, 20x       Balance  Tandem  Tandem on foam, SLS on ground, 1 foot on stool throwing ball Tandem on foam, SLS on ground, 1 foot on stool and 1 on foam throwing ball SLS on R LE in //    Leg press     100#, 40x 100#, 30x    Mini squats     10x a //     Heel raise to eccentric lower on R      20x   MANUAL: (5 minutes) to decrease muscle soreness in anterior compartment    MODALITIES: (15 minutes):      *  Cold Pack Therapy in order to provide analgesia and reduce inflammation and edema. Treatment/Session Assessment:    · Response to Treatment:  Pt challenged w/ balance exercises and ankle strengthening exercises. He is progressing towards his goals. · Compliance with Program/Exercises: Will assess as treatment progresses. · Recommendations/Intent for next treatment session: \"Next visit will focus on advancements to more challenging activities\".   Variance from POC: none  PT Patient Time In/Time Out  Time In: 0945  Time Out: [address], PT

## 2017-10-09 ENCOUNTER — HOSPITAL ENCOUNTER (OUTPATIENT)
Dept: PHYSICAL THERAPY | Age: 52
Discharge: HOME | End: 2017-10-09
Payer: MEDICARE

## 2017-10-09 PROCEDURE — 97110 THERAPEUTIC EXERCISES: CPT

## 2017-10-09 PROCEDURE — G8980 MOBILITY D/C STATUS: HCPCS

## 2017-10-09 PROCEDURE — 97016 VASOPNEUMATIC DEVICE THERAPY: CPT

## 2017-10-09 PROCEDURE — G8978 MOBILITY CURRENT STATUS: HCPCS

## 2017-10-09 PROCEDURE — G8979 MOBILITY GOAL STATUS: HCPCS

## 2017-10-09 NOTE — PROGRESS NOTES
Ana Contreras  : 1965 [facility]  [address], [address], [place]  [phone]   [phone]         OUTPATIENT PHYSICAL THERAPY:Daily Note and Discharge 10/9/2017    ICD-10: Treatment Diagnosis: Pain in right ankle and joints of right foot (M25.571); Stiffness of right ankle, not elsewhere classified (M25.671)  Precautions/Allergies:   Penicillins   Fall Risk Score: 1 (? 5 = High Risk)  MD Orders: ROM, strengthening, WBAT MEDICAL/REFERRING DIAGNOSIS:  s/p hardware removal right ankle   DATE OF ONSET: Sx on 17  REFERRING PHYSICIAN: Camille Hathaway MD  RETURN PHYSICIAN APPOINTMENT: 10/2/17     INITIAL ASSESSMENT:  Mr. Mic Crump presented to PT eval s/p hardware removal of R ankle. He had screws placed in his ankle from a work related injury back in , but wanted them out because they were causing a lot of pain. He has increased his balance, R ankle ROM, and R ankle strength. Additionally, he has met 4/4 of his PT goals. He still reports occasional pain and swelling when he is on his feet a lot doing heavy manual work, but that was his baseline level prior to his surgery to remove the hardware. DC from outpatient PT at this time. TREATMENT PLAN:    GOALS: (Goals have been discussed and agreed upon with patient.)  Discharge Goals: Time Frame: 3 weeks  1. Pt will be independent w/ his HEP in order to decrease pain and increase functional mobility. MET  2. Pt will have R ankle ROM equal to that of his L ankle ROM in order to increase functional mobility. MET  3. Pt will have R ankle strength equal to that of his L ankle in order to increase his functional mobility. 4. Pt will have FAAM score of 80 or more in order to report an increase in functional mobility and a decrease in pain.  MET   Rehabilitation Potential For Stated Goals: Good  Regarding Noy Estrella Puckett's therapy, I certify that the treatment plan above will be carried out by a therapist or under their direction. Thank you for this referral,  Don Mendoza PT                 The information in this section was collected on 9/18/17 (except where otherwise noted). HISTORY:   History of Present Injury/Illness (Reason for Referral):  Pt injured at work in April 2012 - at work helping a coworker pull down a pipe when it fell down on his L shoulder and R ankle. He is now on disability from that accident. He had screws placed in his R ankle in 2012, and they were removed on 9/1/17. He is WBAT. Past Medical History/Comorbidities:   Mr. Jennifer Elise  has a past medical history of Acromioclavicular joint arthritis; Allergic rhinitis (8/11/2016); Arrhythmia; Arthritis; Cervical radiculopathy (8/11/2016); Chronic pain; COPD (chronic obstructive pulmonary disease) ([facility] (08/11/2016); Deficiency, lipoprotein (8/11/2016); Depression (8/11/2016); Disorders of bursae and tendons in shoulder region, unspecified (12/23/2011); Dyslipidemia (8/11/2016); Dyspnea (8/11/2016); ED (erectile dysfunction) (8/11/2016); Encounter for long-term (current) use of medications (8/11/2016); Encounter for prostate cancer screening (8/11/2016); Gout; Gout (8/11/2016); HTN (hypertension), benign (8/11/2016); Hypercholesterolemia; Hypertension; Insomnia; Leg pain (8/11/2016); Lumbar back pain (8/11/2016); Metabolic syndrome (1/50/1881); Primary localized osteoarthrosis, shoulder region (11/10/2011); RA (rheumatoid arthritis) ([facility] Seasonal allergies; Shoulder pain (8/11/2016); Sprain and strain of other specified sites of shoulder and upper arm (11/10/2011); Superior glenoid labrum lesion (11/10/2011); Supraspinatus (muscle) (tendon) sprain (11/10/2011); and Unspecified adverse effect of anesthesia. He also has no past medical history of Adverse effect of anesthesia; COPD; Difficult intubation; Malignant hyperthermia due to anesthesia; Nausea & vomiting; or Pseudocholinesterase deficiency.   Mr. Rodriguez Gave  has a past surgical history that includes upper arm/elbow surgery unlisted (1997); removal anal fistula,subcutaneous (2005); cervical fusion (2011); back surgery (2001); and shoulder arthroscopy (11/2011). Social History/Living Environment:     Disabled, lives alone in mobile home  Prior Level of Function/Work/Activity:  Disabled, able to perform yard work as needed   Dominant Side:         RIGHT    Current Medications:       Current Outpatient Prescriptions:     multivitamin (ONE A DAY) tablet, Take 1 Tab by mouth daily. Stop seven days prior to surgery per anesthesia protocol., Disp: , Rfl:     omega 3-dha-epa-fish oil (FISH OIL) 100-160-1,000 mg cap, Take 1 Tab by mouth daily. Stop seven days prior to surgery per anesthesia protocol., Disp: , Rfl:     HYDROcodone-acetaminophen (NORCO)  mg tablet, Take 1 Tab by mouth every six (6) hours. Max Daily Amount: 4 Tabs. Indications: Pain (Patient taking differently: Take 1 Tab by mouth every six (6) hours. Take day of surgery per anesthesia protocol. Indications: Pain), Disp: 105 Tab, Rfl: 0    metoprolol tartrate (LOPRESSOR) 50 mg tablet, Take 1 Tab by mouth two (2) times a day. (Patient taking differently: Take 50 mg by mouth two (2) times a day. Take day of surgery per anesthesia protocol. Indications: heart rate control), Disp: 180 Tab, Rfl: 3    lisinopril (PRINIVIL, ZESTRIL) 20 mg tablet, Take 1 Tab by mouth every morning. Takes in AM (Patient taking differently: Take 20 mg by mouth every morning. Takes in AM  Indications: hypertension), Disp: 90 Tab, Rfl: 3    indomethacin (INDOCIN) 25 mg capsule, Take 1 Cap by mouth three (3) times daily. (Patient taking differently: Take 25 mg by mouth three (3) times daily. Stop 5 days prior to surgery per anesthesia protocol. Indications: GOUT), Disp: 30 Cap, Rfl: 5    allopurinol (ZYLOPRIM) 300 mg tablet, Take 1 Tab by mouth daily. Indications: GOUT (Patient taking differently: Take 300 mg by mouth every morning. Take day of surgery per anesthesia protocol. Indications: GOUT), Disp: 90 Tab, Rfl: 3    montelukast (SINGULAIR) 10 mg tablet, Take 1 Tab by mouth daily. , Disp: 30 Tab, Rfl: 11   Date Last Reviewed:  10/9/2017    EXAMINATION:   Observation/Orthostatic Postural Assessment:          Rounded shoulders in sit and stand  Palpation:          Tender along incisions and malleoli   Girth:        58 cm on L, 59.5 cm on R - figure 8 test        28 cm L, 30 cm R - circumference of malleoli  Functional Mobility:         Gait/Ambulation:  Independent, minimal antalgic gait         Transfers:  Independent, no UE assist needed   Balance:          Decreased SLS on B LEs w/ R worse than L  Sensation:        Intact to B LEs w/ light touch, some numbness around incision     Joint/Muscle ROM Strength    DF 0* on L, 0* on R L 5/5, R 5/5    PF 50* on L, 50* on R L 5/5, R 5/5    Inversion 28* on L, 28* on R L 5/5, R 5/5    Eversion 22* on L, 22* on R L 5/5, R 5/5         Body Structures Involved:  1. Bones  2. Joints  3. Muscles Body Functions Affected:  1. Sensory/Pain  2. Neuromusculoskeletal  3. Movement Related Activities and Participation Affected:  1. General Tasks and Demands  2. Mobility  3. Domestic Life  4. Interpersonal Interactions and Relationships  5. Community, Social and Civic Life   CLINICAL PRESENTATION:   CLINICAL DECISION MAKING:   Outcome Measure: Tool Used: PT/OT FOOT AND ANKLE ABILITY MEASURE  Score:  Initial: 77/84 Most Recent: 80/84 (Date: 10/9/17 )   Interpretation of Score: For the \"Activities of Daily Living\", there are 21 questions each scored on a 5 point scale with 0 representing \"Unable to do\" and 4 representing \"No difficulty\". The lower the score, the greater the functional disability. 84/84 represents no disability. Minimal detectable change is 5.7 points.   With the addition of the 8 questions in the \"Sports Subscale,\" there are 29 questions, each scored on a 5 point scale with 0 representing \"Unable to do\" and 4 representing \"No difficulty\". The lower the score, the greater the functional disability. 116/116 represents no disability. Minimal detectable change is 12.3 points. Activities of Daily Living:  Score 84 83-68 67-51 50-34 33-18 17-1 0   Modifier CH CI CJ CK CL CM CN     Activities of Daily Living + Sports Subscale:  Score 116 115-94 93-71 70-47 46-24 23-1 0   Modifier CH CI CJ CK CL CM CN     ? Mobility - Walking and Moving Around:     - CURRENT STATUS: CI - 1%-19% impaired, limited or restricted    - GOAL STATUS: CI - 1%-19% impaired, limited or restricted    - D/C STATUS:  CI - 1%-19% impaired, limited or restricted              TREATMENT:   (In addition to Assessment/Re-Assessment sessions the following treatments were rendered)  Pre-treatment Symptoms/Complaints:  Pt reports he was on his feet all day today getting a stump out of the ground. He notes he feels very stiff. Pain: Initial:     5-6/10 Post Session:  2/10     THERAPEUTIC EXERCISE: (28 minutes):  Exercises per grid below to improve mobility, strength and balance. Required minimal verbal and manual cues to promote proper body alignment, promote proper body posture and promote proper body mechanics. Progressed resistance, range, repetitions and complexity of movement as indicated.      Date:  9/18/17 Date:  9/20/17 Date:  9/25/17 Date:  9/27/17 Date:  10/2/17 Date:  10/4/17 Date:  10/9/17   Activity/Exercise Parameters Parameters Parameters Parameters Parameters     HEP Issued         Calf stretch 3x30 sec w/ belt         Inversion 20x, YTB 30x, YTB 30x, YTB 30x, YTB 30x, YTB  30x, YTB   Eversion 20x, YTB 30x, YTB 30x, YTB 30x, YTB 30x, YTB  30x, YTB   DF 20x, YTB 30x, YTB 30x, YTB 30x, YTB 30x, YTB  30x, YTB   PF 20x, YTB 30x, YTB 30x, YTB 30x, YTB 30x, YTB  30x, YTB   Bike/nustep  6 mins, bike 10 mins, level 1, NS 10 mins, bike 10 mins, bike 10 mins, bike 10 mins bike   Wobble board  30x forward back, side to side, rotation 30x forward/back, side/side, rotation 30 forward/back, side/side, rotation  30 forward/back, side/side, rotation 30 forward/back, side/side, and rotation in stand  30 forward/back, side/side, rotation   Toe curls  10x        Calf stretch  3x30 sec on wedge 3x30 sec on wedge 3x30 sec on wedge 3x30 sec on wedge 3x30 sec on wedge    Soleus stretch  3x30 sec on wedge 3x30 sec on wedge 3x30 sec on wedge 3x30 sec on wedge 3x30 sec on wedge     Step downs  30x on 3 inch step 30x on 5 inch step 30x on 3 inch step 30x, 3 inch step 30x, 3 inch step    Step ups  20x on 8 inch step 30x on 8 inch step 30x on 6 inch step 30x on 6 inch step 30x 8 inch step    SLS star balance   20x 3 different positions standing on R LE in // 20x3 different positions standing on R LE in //  20x 3 different positions standing on R LE in // 20x3 different positions standing on R LE in //     Heel taps  1 inch step, 20x        Balance  Tandem  Tandem on foam, SLS on ground, 1 foot on stool throwing ball Tandem on foam, SLS on ground, 1 foot on stool and 1 on foam throwing ball SLS on R LE in //     Leg press     100#, 40x 100#, 30x     Mini squats     10x a //      Heel raise to eccentric lower on R      20x    MANUAL: (0 minutes) to decrease muscle soreness in anterior compartment    MODALITIES: (15 minutes):      *  Cold Pack Therapy in order to provide analgesia and reduce inflammation and edema. Treatment/Session Assessment:    · Response to Treatment:  Pt has met 4/4 goals at this time and is independent w/ his HEP. His biggest issues are pain and swelling after being on his feet doing heavy manual work. · Compliance with Program/Exercises: Will assess as treatment progresses. · Recommendations/Intent for next treatment session: \"Next visit will focus on advancements to more challenging activities\".   Variance from POC: none  PT Patient Time In/Time Out  Time In: 4202  Time Out: 200 [facility],

## 2017-10-11 ENCOUNTER — APPOINTMENT (OUTPATIENT)
Dept: PHYSICAL THERAPY | Age: 52
End: 2017-10-11
Payer: MEDICARE

## 2019-08-02 ENCOUNTER — HOSPITAL ENCOUNTER (OUTPATIENT)
Dept: LAB | Age: 54
Discharge: HOME | End: 2019-08-02

## 2019-08-02 PROCEDURE — 88305 TISSUE EXAM BY PATHOLOGIST: CPT

## 2019-08-02 PROCEDURE — 88312 SPECIAL STAINS GROUP 1: CPT

## 2024-07-30 ENCOUNTER — OFFICE VISIT (OUTPATIENT)
Dept: NEUROSURGERY | Age: 59
End: 2024-07-30
Payer: MEDICARE

## 2024-07-30 VITALS
TEMPERATURE: 97.3 F | OXYGEN SATURATION: 95 % | DIASTOLIC BLOOD PRESSURE: 104 MMHG | WEIGHT: 265 LBS | SYSTOLIC BLOOD PRESSURE: 145 MMHG | HEART RATE: 77 BPM | HEIGHT: 73 IN | BODY MASS INDEX: 35.12 KG/M2

## 2024-07-30 DIAGNOSIS — M54.2 NECK PAIN: Primary | ICD-10-CM

## 2024-07-30 DIAGNOSIS — M54.12 CERVICAL RADICULOPATHY: ICD-10-CM

## 2024-07-30 DIAGNOSIS — M79.602 LEFT ARM PAIN: ICD-10-CM

## 2024-07-30 PROCEDURE — 3080F DIAST BP >= 90 MM HG: CPT | Performed by: NURSE PRACTITIONER

## 2024-07-30 PROCEDURE — 99202 OFFICE O/P NEW SF 15 MIN: CPT | Performed by: NURSE PRACTITIONER

## 2024-07-30 PROCEDURE — 3077F SYST BP >= 140 MM HG: CPT | Performed by: NURSE PRACTITIONER

## 2024-07-30 RX ORDER — TESTOSTERONE 30 MG/1.5ML
SOLUTION TOPICAL
COMMUNITY

## 2024-07-30 NOTE — PROGRESS NOTES
[facility] NOTE:   History of Present Illness:    CC: Left arm pain    Casey Pittman is a 58 y.o. right handed male who presents today for evaluation of his left arm pain.  Patient states that he had a previous C5-6 fusion with Dr. Siddharth ibrahim in 2011 for symptoms that are very similar to what he is experiencing now.  Patient states that since February he has been having pain that runs down the left arm into the left hand encompassing all the fingers except the pinky finger.  Patient states that often feels like there is a force trying to push his fingers apart and tear the part.  Patient also notes that he feels as though he may be starting to experience some right handed tingling as well.  The cervical MRI reveals bilateral C3-4 foraminal narrowing with the left worse than the right; a previous C5-6 fusion; possibly some left C4-5 narrowing; and left C6-7 narrowing.  Patient states he is done physical therapy since the new year he does not feel as though it is benefiting his neck at all.  Patient states he has not recently had any cervical injections.    Past Medical History:   Diagnosis Date    Acromioclavicular joint arthritis     left shoulder    Allergic rhinitis 8/11/2016    Arrhythmia     heart rate sometimes increases     Arthritis     Cervical radiculopathy 8/11/2016    Chronic pain     neck and lower back    COPD (chronic obstructive pulmonary disease) (HCC) 08/11/2016    Deficiency, lipoprotein 8/11/2016    Depression 8/11/2016    Disorders of bursae and tendons in shoulder region, unspecified 12/23/2011    Dyslipidemia 8/11/2016    Dyspnea 8/11/2016    ED (erectile dysfunction) 8/11/2016    Encounter for long-term (current) use of medications 8/11/2016    Encounter for prostate cancer screening 8/11/2016    Gout 8/11/2016    Gout     last exacerbation 10/29/2011    HTN (hypertension), benign 8/11/2016    Hypercholesterolemia     Hypertension     controlled with

## 2024-08-14 ENCOUNTER — OFFICE VISIT (OUTPATIENT)
Dept: NEUROSURGERY | Age: 59
End: 2024-08-14
Payer: MEDICARE

## 2024-08-14 VITALS
HEIGHT: 73 IN | SYSTOLIC BLOOD PRESSURE: 168 MMHG | TEMPERATURE: 97.3 F | OXYGEN SATURATION: 96 % | WEIGHT: 261 LBS | DIASTOLIC BLOOD PRESSURE: 78 MMHG | BODY MASS INDEX: 34.59 KG/M2 | HEART RATE: 62 BPM

## 2024-08-14 DIAGNOSIS — M79.602 LEFT ARM PAIN: ICD-10-CM

## 2024-08-14 DIAGNOSIS — M54.2 NECK PAIN: ICD-10-CM

## 2024-08-14 DIAGNOSIS — M54.12 CERVICAL RADICULOPATHY: ICD-10-CM

## 2024-08-14 DIAGNOSIS — M48.02 CERVICAL SPINAL STENOSIS: Primary | ICD-10-CM

## 2024-08-14 PROCEDURE — 3078F DIAST BP <80 MM HG: CPT | Performed by: NEUROLOGICAL SURGERY

## 2024-08-14 PROCEDURE — 3077F SYST BP >= 140 MM HG: CPT | Performed by: NEUROLOGICAL SURGERY

## 2024-08-14 PROCEDURE — 99214 OFFICE O/P EST MOD 30 MIN: CPT | Performed by: NEUROLOGICAL SURGERY

## 2024-08-14 NOTE — PROGRESS NOTES
lesion    HTN (hypertension), benign        Review of Systems      Physical Exam:  Ht 1.854 m (6' 1\")   Wt 118.4 kg (261 lb)   BMI 34.43 kg/m²   Pain Score:   5  Head normocephalic and atraumatic  Mood and affect appropriate  General: No acute distress  CV: Regular rate  Resp: No increased work of breathing  Skin: warm and dry  Awake, alert, and oriented   Speech Fluent  Eyes open spontaneously   Face symmetric and tongue midline on protrusion  Sternocleidomastoid and trapezius strength 5/5  No mid-line cervical, thoracic, or lumbar tenderness to palpation   Patient with strength exam as follows:   Upper Extremities: Right Left      Deltoid  5 5    Biceps  5 5    Triceps 5 5      5 5     Lower Extremities:      Hip Flex 5 5    Quads  5 5    Hamstrings 5 5    Dorsiflex 5 5    Plantarflex 5 5    EHL  5 5  Sensation intact to light touch and pin-prick   DTR 2+  No clonus or babinski present   No Mckenzie's sign present bilaterally   Gait ambulates with an antalgic gait with a hunched forward posture at the waist    Assessment & Plan:  Casey Pittman is a 58 y.o. male who presents today for evaluation of left upper extremity pain, left hand numbness and tingling.  I have independently reviewed and interpreted the patient's MRI cervical spine with and without contrast performed at [facility] on 5/20/2024 that demonstrates a prior C5-C6 anterior cervical discectomy and fusion without evidence of residual spinal stenosis.  At C3-C4 and C4-C5 there is evidence of cervical spondylosis with a disc osteophyte complex that effaces the ventral CSF signal at C4-C5 ventrally and results in mild spinal stenosis.  At C3-C4 there is a broad-based disc osteophyte complex that effaces the ventral CSF signal results in mild compression and moderate spinal stenosis.  At C3-C4 there is severe neuroforaminal stenosis.  C4-C5 there is moderate bilateral neuroforaminal stenosis.  The patient was referred to

## 2024-08-15 ENCOUNTER — TELEPHONE (OUTPATIENT)
Dept: NEUROSURGERY | Age: 59
End: 2024-08-15

## 2024-08-15 NOTE — TELEPHONE ENCOUNTER
Lvm for pt to call me back regarding the pain mgnt referral Dr. White has wrote on his encounter for him to be referred to [facility] for a Cervical Paraspinal block or laurence. Pt was referred to Dr. Smith's office at his last visit with our NP and denied his appt/referral there.     Dr. White the referral for next door is on your encounter but not in your note? Just wanted to double check this also..   Thank you.

## 2024-08-19 ENCOUNTER — TELEPHONE (OUTPATIENT)
Dept: NEUROSURGERY | Age: 59
End: 2024-08-19

## 2024-08-19 NOTE — TELEPHONE ENCOUNTER
Explained to patient why he was being referred to pain management -will send a MY chart message with pain managements phone #

## 2024-08-28 ENCOUNTER — HOSPITAL ENCOUNTER (OUTPATIENT)
Dept: CT IMAGING | Age: 59
Discharge: HOME | End: 2024-08-31
Attending: NEUROLOGICAL SURGERY
Payer: MEDICARE

## 2024-08-28 DIAGNOSIS — M79.602 LEFT ARM PAIN: ICD-10-CM

## 2024-08-28 DIAGNOSIS — M48.02 CERVICAL SPINAL STENOSIS: ICD-10-CM

## 2024-08-28 DIAGNOSIS — M54.2 NECK PAIN: ICD-10-CM

## 2024-08-28 DIAGNOSIS — M54.12 CERVICAL RADICULOPATHY: ICD-10-CM

## 2024-08-28 PROCEDURE — 72131 CT LUMBAR SPINE W/O DYE: CPT

## 2024-09-19 ENCOUNTER — OFFICE VISIT (OUTPATIENT)
Age: 59
End: 2024-09-19

## 2024-09-19 DIAGNOSIS — M54.12 CERVICAL RADICULOPATHY: Primary | ICD-10-CM

## 2024-10-15 ENCOUNTER — OFFICE VISIT (OUTPATIENT)
Dept: ORTHOPEDIC SURGERY | Age: 59
End: 2024-10-15
Payer: MEDICARE

## 2024-10-15 DIAGNOSIS — M54.12 CERVICAL RADICULOPATHY: Primary | ICD-10-CM

## 2024-10-15 PROCEDURE — 62321 NJX INTERLAMINAR CRV/THRC: CPT | Performed by: ANESTHESIOLOGY

## 2024-10-15 RX ORDER — BETAMETHASONE SODIUM PHOSPHATE AND BETAMETHASONE ACETATE 3; 3 MG/ML; MG/ML
30 INJECTION, SUSPENSION INTRA-ARTICULAR; INTRALESIONAL; INTRAMUSCULAR; SOFT TISSUE ONCE
Status: COMPLETED | OUTPATIENT
Start: 2024-10-15 | End: 2024-10-15

## 2024-10-15 RX ADMIN — BETAMETHASONE SODIUM PHOSPHATE AND BETAMETHASONE ACETATE 30 MG: 3; 3 INJECTION, SUSPENSION INTRA-ARTICULAR; INTRALESIONAL; INTRAMUSCULAR; SOFT TISSUE at 08:08

## 2024-10-15 NOTE — PROGRESS NOTES
Procedure Date: October 15, 2024  Location: [facility]       Procedure: C7/TI IL ERNESTO       Time Out performed prior to start of the procedure:       Sarbjit Smith M.D.  performed the following reviews on Casey Pittman 1965 prior to the start of the procedure:       patient was identified by name and     agreement on procedure being performed was verified   risks and benefits explained to patient by the provider  procedure site verified as   patient was positioned for comfort   consent signed and verified for procedure       Time:  8:15 AM        Procedure performed by:   Sarbjit Smith M.D.       Patient assisted by:   ROSS CAZARES MA   
placement of the needle tip. Negative aspiration for blood or CSF was obtained. A total of 8 ml containing combination of 1 ml 1% lidocaine, normal saline, 30 mg betamethasone was then injected.    The needle was withdrawn and Band-Aids were applied. The patient was transported to the recovery room and monitored for an appropriate amount of time, and after meeting discharge criteria, was discharged home with a . No complications were noted through the procedure or recovery period.

## 2024-11-12 ENCOUNTER — OFFICE VISIT (OUTPATIENT)
Age: 59
End: 2024-11-12
Payer: MEDICARE

## 2024-11-12 DIAGNOSIS — M54.12 CERVICAL RADICULOPATHY: Primary | ICD-10-CM

## 2024-11-12 PROCEDURE — 99214 OFFICE O/P EST MOD 30 MIN: CPT | Performed by: PHYSICIAN ASSISTANT

## 2024-11-12 RX ORDER — GABAPENTIN 100 MG/1
100 CAPSULE ORAL 3 TIMES DAILY
Qty: 90 CAPSULE | Refills: 1 | Status: SHIPPED | OUTPATIENT
Start: 2024-11-12 | End: 2025-01-11

## 2024-11-12 ASSESSMENT — ENCOUNTER SYMPTOMS
EYES NEGATIVE: 1
ABDOMINAL PAIN: 0
SHORTNESS OF BREATH: 0
ALLERGIC/IMMUNOLOGIC NEGATIVE: 1

## 2024-11-12 NOTE — PROGRESS NOTES
Mr. Pittman is a first-time follow-up for new patient visit.  At his initial visit we ordered a cervical ERNESTO.  He underwent injection on October 15, 2024.  
Smokeless tobacco: Former    Tobacco comments:     Quit smoking: Used chewing tobacco in high school   Substance and Sexual Activity    Alcohol use: Yes     Alcohol/week: 6.0 standard drinks of alcohol    Drug use: Yes     Frequency: 3.0 times per week     Types: Prescription    Sexual activity: Not on file   Other Topics Concern    Not on file   Social History Narrative    Not on file     Social Determinants of Health     Financial Resource Strain: Low Risk  (2/20/2024)    Received from [facility]    Financial Resource Strain     Difficulty Paying Living Expenses: Not hard at all     Difficulty Paying Medical Expenses: No   Food Insecurity: No Food Insecurity (2/20/2024)    Received from [facility]    Food Insecurity     Worried about Running Out of Food in the Last Year: Never true     Ran Out of Food in the Last Year: Never true   Transportation Needs: No Transportation Needs (2/20/2024)    Received from [facility]    Transportation Needs     Lack of Transportation: No   Physical Activity: Inactive (2/20/2024)    Received from [facility]    Physical Activity     Days of Exercise per Week: 0     Minutes of Exercise per Session: 0     Total Minutes of Exercise per Week: 0   Stress: Stress Concern Present (2/20/2024)    Received from [facility]    Stress     Feeling of Stress : To some extent   Social Connections: Moderately Integrated (2/20/2024)    Received from [facility]    Social Connections     Frequency of Communication with Friends and Family: Twice a week     Frequency of Social Gatherings with Friends and Family: Twice a week   Intimate Partner Violence: Not At Risk (2/20/2024)    Received from [facility]    Intimate Partner Violence     Fear of Current or Ex-Partner: No     Emotionally Abused: No     Physically Abused: No     Sexually Abused: No   Housing Stability: Not At Risk

## 2024-12-09 ENCOUNTER — OFFICE VISIT (OUTPATIENT)
Dept: NEUROSURGERY | Age: 59
End: 2024-12-09
Payer: MEDICARE

## 2024-12-09 VITALS
DIASTOLIC BLOOD PRESSURE: 86 MMHG | TEMPERATURE: 97.9 F | BODY MASS INDEX: 34.96 KG/M2 | WEIGHT: 265 LBS | SYSTOLIC BLOOD PRESSURE: 156 MMHG | HEART RATE: 65 BPM | OXYGEN SATURATION: 96 %

## 2024-12-09 DIAGNOSIS — R29.898 WEAKNESS OF BOTH UPPER EXTREMITIES: ICD-10-CM

## 2024-12-09 DIAGNOSIS — M48.02 CERVICAL SPINAL STENOSIS: Primary | ICD-10-CM

## 2024-12-09 DIAGNOSIS — M54.2 NECK PAIN: ICD-10-CM

## 2024-12-09 DIAGNOSIS — M54.12 CERVICAL RADICULOPATHY: ICD-10-CM

## 2024-12-09 DIAGNOSIS — M51.360 DEGENERATION OF INTERVERTEBRAL DISC OF LUMBAR REGION WITH DISCOGENIC BACK PAIN: ICD-10-CM

## 2024-12-09 PROCEDURE — 3079F DIAST BP 80-89 MM HG: CPT | Performed by: NEUROLOGICAL SURGERY

## 2024-12-09 PROCEDURE — 99214 OFFICE O/P EST MOD 30 MIN: CPT | Performed by: NEUROLOGICAL SURGERY

## 2024-12-09 PROCEDURE — 3077F SYST BP >= 140 MM HG: CPT | Performed by: NEUROLOGICAL SURGERY

## 2024-12-09 NOTE — PROGRESS NOTES
Mr Pittman is a 4 week f/u . He did meet with Dr White yesterday at which time he has planned on updating his lumbar and cervical MRIs in anticipation of cervical surgery. We started him on Gabapentin as his last visit 100mg TID.

## 2024-12-09 NOTE — PROGRESS NOTES
[facility] NOTE:   History of Present Illness:    CC: Follow-up evaluation regarding neck pain, bilateral upper extremity pain, bilateral upper extremity numbness and tingling and chronic low back pain    Casey Pittman is a 59 y.o. male who presents today for evaluation of follow-up evaluation regarding neck pain, bilateral upper extremity pain, bilateral upper extremity numbness and tingling and chronic low back pain.  The patient has had left greater than right upper extremity pain with numbness and tingling since February of this year.  Initially this started out as primarily affecting his left upper extremity but has now progressed to involve his entire left hand and is spreading into his right hand as well.  He feels that his  strength is lessening and that he has dropped objects.  He states that 1 morning he woke up and his entire left arm was limp and numb and he had to move around with his other arm.  He said this resolved after an hour.  He endorses chronic low back pain for approximately 40 years time and mid back pain as well.  He feels that he cannot extend his legs very well secondary to the pain he has a non-smoker and uses no smokeless chewing tobacco or nicotine containing products.  He underwent a cervical epidural steroid injection performed by Dr. Sarbjit Smith at the C7-T1 interspace which did not alleviate his pain and discomfort.  The patient states he has been in physical therapy for 2 years time which is only exacerbated his pain and discomfort he currently rates his low back pain of a 10 out of 10 on a visual analog pain scale that is exacerbated with standing or walking.  His neck pain and upper extremity pain he rates as a 5 out of 10 on a visual analog pain scale.  He is treated this with gabapentin as well but states the gabapentin worsened his balance. The patient has an MRI cervical spine with and without contrast performed at [facility]

## 2024-12-10 ENCOUNTER — OFFICE VISIT (OUTPATIENT)
Age: 59
End: 2024-12-10
Payer: MEDICARE

## 2024-12-10 DIAGNOSIS — M54.12 CERVICAL RADICULOPATHY: Primary | ICD-10-CM

## 2024-12-10 PROCEDURE — 99214 OFFICE O/P EST MOD 30 MIN: CPT | Performed by: PHYSICIAN ASSISTANT

## 2024-12-10 RX ORDER — DULOXETIN HYDROCHLORIDE 20 MG/1
20 CAPSULE, DELAYED RELEASE ORAL DAILY
Qty: 30 CAPSULE | Refills: 2 | Status: SHIPPED | OUTPATIENT
Start: 2024-12-10 | End: 2025-03-10

## 2024-12-10 ASSESSMENT — ENCOUNTER SYMPTOMS
SHORTNESS OF BREATH: 0
ABDOMINAL PAIN: 0
EYES NEGATIVE: 1
ALLERGIC/IMMUNOLOGIC NEGATIVE: 1

## 2024-12-10 NOTE — PROGRESS NOTES
Chronic Pain Consult Note      Plan:    A comprehensive pain management plan may consist of the following: Testing, Therapy, Medications, Interventions, Consults, and Follow up.    Cervical Radiculopathy  MRI of cervical spine reviewed.   S/p C7/T1 ERNESTO with no relief  Scheduled with Dr White for potential surgery on his neck.  STOP Gabapentin due to dizziness.   Initiate Cymbalta 20mg once daily for nerve pain and mood. Discussed this could increase BP in theory so he should monitor BP.  I will send this in with 2 refills.  If he is tolerating but it is not helping with pain this is adjustable.  He will follow-up after seeing Dr. White again.  Low back pain with radiculopathy  MRI pending.   Discussed we may have potential options for his back pain in the future. Will hold on the moment until we know more about what is going on.     General Recommendations: The pain condition that the patient suffers from is best treated with a multidisciplinary approach that involves an increase in physical activity to prevent de-conditioning and worsening of the pain cycle, as well as psychological counseling (formal and/or informal) to address the co morbid psychological effects of pain. Treatment will often involve judicious use of pain medications and interventional procedures to decrease the pain, allowing the patient to participate in the physical activity that will ultimately produce long-lasting pain reductions. The goal of the multidisciplinary approach is to return the patient to a higher level of overall function and to restore their ability to perform activities of daily living.    Narcan nasal spray was prescribed on:    No orders of the defined types were placed in this encounter.    Requested Prescriptions     Signed Prescriptions Disp Refills    DULoxetine (CYMBALTA) 20 MG extended release capsule 30 capsule 2     Sig: Take 1 capsule by mouth daily          Referring Provider: Emily Osei APRN -

## 2024-12-20 ENCOUNTER — HOSPITAL ENCOUNTER (OUTPATIENT)
Dept: MRI IMAGING | Age: 59
Discharge: HOME | End: 2024-12-23
Attending: NEUROLOGICAL SURGERY
Payer: MEDICARE

## 2024-12-20 DIAGNOSIS — R29.898 WEAKNESS OF BOTH UPPER EXTREMITIES: ICD-10-CM

## 2024-12-20 DIAGNOSIS — M54.12 CERVICAL RADICULOPATHY: ICD-10-CM

## 2024-12-20 DIAGNOSIS — M48.02 CERVICAL SPINAL STENOSIS: ICD-10-CM

## 2024-12-20 DIAGNOSIS — M51.360 DEGENERATION OF INTERVERTEBRAL DISC OF LUMBAR REGION WITH DISCOGENIC BACK PAIN: ICD-10-CM

## 2024-12-20 DIAGNOSIS — M54.2 NECK PAIN: ICD-10-CM

## 2024-12-20 PROCEDURE — 72141 MRI NECK SPINE W/O DYE: CPT

## 2024-12-20 PROCEDURE — 72148 MRI LUMBAR SPINE W/O DYE: CPT

## 2025-01-06 ENCOUNTER — OFFICE VISIT (OUTPATIENT)
Dept: NEUROSURGERY | Age: 60
End: 2025-01-06
Payer: MEDICARE

## 2025-01-06 VITALS
OXYGEN SATURATION: 95 % | DIASTOLIC BLOOD PRESSURE: 70 MMHG | SYSTOLIC BLOOD PRESSURE: 135 MMHG | HEIGHT: 73 IN | BODY MASS INDEX: 35.12 KG/M2 | HEART RATE: 58 BPM | WEIGHT: 265 LBS

## 2025-01-06 DIAGNOSIS — R20.0 NUMBNESS AND TINGLING OF BOTH UPPER EXTREMITIES: ICD-10-CM

## 2025-01-06 DIAGNOSIS — R20.2 NUMBNESS AND TINGLING OF BOTH UPPER EXTREMITIES: ICD-10-CM

## 2025-01-06 DIAGNOSIS — M54.2 NECK PAIN: ICD-10-CM

## 2025-01-06 DIAGNOSIS — M54.12 CERVICAL RADICULOPATHY: ICD-10-CM

## 2025-01-06 DIAGNOSIS — M48.02 CERVICAL SPINAL STENOSIS: Primary | ICD-10-CM

## 2025-01-06 DIAGNOSIS — R29.898 WEAKNESS OF BOTH UPPER EXTREMITIES: ICD-10-CM

## 2025-01-06 PROCEDURE — 99214 OFFICE O/P EST MOD 30 MIN: CPT | Performed by: NEUROLOGICAL SURGERY

## 2025-01-06 PROCEDURE — 3075F SYST BP GE 130 - 139MM HG: CPT | Performed by: NEUROLOGICAL SURGERY

## 2025-01-06 PROCEDURE — G8427 DOCREV CUR MEDS BY ELIG CLIN: HCPCS | Performed by: NEUROLOGICAL SURGERY

## 2025-01-06 PROCEDURE — 3017F COLORECTAL CA SCREEN DOC REV: CPT | Performed by: NEUROLOGICAL SURGERY

## 2025-01-06 PROCEDURE — 3078F DIAST BP <80 MM HG: CPT | Performed by: NEUROLOGICAL SURGERY

## 2025-01-06 PROCEDURE — G8417 CALC BMI ABV UP PARAM F/U: HCPCS | Performed by: NEUROLOGICAL SURGERY

## 2025-01-06 PROCEDURE — 1036F TOBACCO NON-USER: CPT | Performed by: NEUROLOGICAL SURGERY

## 2025-01-06 NOTE — PROGRESS NOTES
[facility] CLINIC NOTE:   History of Present Illness:    CC: Follow-up evaluation regarding neck pain, bilateral upper extremity pain, bilateral extremity numbness and tingling and chronic low back pain.    Casey Pittman is a 59 y.o. male who presents today for evaluation of follow-up evaluation regarding neck pain, bilateral lower extremity pain, bilateral extremity extremity numbness and tingling and chronic low back pain.  We have seen the patient before on previous visits.  The patient has had left greater than right upper extremity pain and numbness and tingling that has been progressing and worsening since February of this year.  Initially affected his left upper extremity and left hand but now feels that his  strength is lessened and he has dropped objects.  He woke up 1 morning with an entire left arm being completely numb.  He also endorses chronic low back pain for 40 years and has a history of undergoing a lumbar laminectomy.  He also has a history of undergoing an C5-C6 anterior cervical discectomy and fusion.  He underwent a C5-C6 anterior cervical discectomy and fusion performed Dr. Carmen Messina on 7/29/2011 with [place] primary anterior cervical plating system.  He has undergone physical therapy for 2 years time which is only exacerbated his pain.  He also has undergone a cervical epidural steroid injections.  The patient has an MRI cervical spine without contrast performed on 12/20/2024 when the cervical spine that demonstrates a history of a prior C5-C6 anterior cervical discectomy and fusion construct with proper positioning of the anterior interbody device.  At C3-C4 there is a disc osteophyte complex that effaces the ventral CSF signal and ultimately results in concentric spinal stenosis with the AP diameter of the canal being 8.3 mm at C3-C4.  There is moderate central and moderate neuroforaminal stenosis at this level.  At C4-C5 there is mild spinal

## 2025-01-22 ENCOUNTER — TELEPHONE (OUTPATIENT)
Dept: NEUROSURGERY | Age: 60
End: 2025-01-22

## 2025-01-22 NOTE — TELEPHONE ENCOUNTER
Patient left a VM on nurses line he is waiting for a call for surgery TBS- he said he has been holding medication but now has to go back on- I will route to surgery scheduler

## 2025-01-24 ENCOUNTER — PREP FOR PROCEDURE (OUTPATIENT)
Dept: NEUROSURGERY | Age: 60
End: 2025-01-24

## 2025-01-24 DIAGNOSIS — R20.0 NUMBNESS AND TINGLING OF BOTH UPPER EXTREMITIES: ICD-10-CM

## 2025-01-24 DIAGNOSIS — M54.2 NECK PAIN: ICD-10-CM

## 2025-01-24 DIAGNOSIS — M48.02 CERVICAL SPINAL STENOSIS: ICD-10-CM

## 2025-01-24 DIAGNOSIS — R20.2 NUMBNESS AND TINGLING OF BOTH UPPER EXTREMITIES: ICD-10-CM

## 2025-01-24 DIAGNOSIS — R29.898 UPPER EXTREMITY WEAKNESS: ICD-10-CM

## 2025-01-27 PROBLEM — M54.2 NECK PAIN: Status: ACTIVE | Noted: 2025-01-24

## 2025-01-27 PROBLEM — R29.898 UPPER EXTREMITY WEAKNESS: Status: ACTIVE | Noted: 2025-01-24

## 2025-01-27 PROBLEM — R20.0 NUMBNESS AND TINGLING OF BOTH UPPER EXTREMITIES: Status: ACTIVE | Noted: 2025-01-24

## 2025-01-27 PROBLEM — R20.2 NUMBNESS AND TINGLING OF BOTH UPPER EXTREMITIES: Status: ACTIVE | Noted: 2025-01-24

## 2025-01-27 PROBLEM — M48.02 CERVICAL SPINAL STENOSIS: Status: ACTIVE | Noted: 2025-01-24

## 2025-01-30 ENCOUNTER — HOSPITAL ENCOUNTER (OUTPATIENT)
Dept: SURGERY | Age: 60
Discharge: HOME | End: 2025-01-30
Payer: MEDICARE

## 2025-01-30 VITALS
TEMPERATURE: 97.5 F | HEIGHT: 74 IN | DIASTOLIC BLOOD PRESSURE: 88 MMHG | OXYGEN SATURATION: 95 % | BODY MASS INDEX: 33.97 KG/M2 | SYSTOLIC BLOOD PRESSURE: 145 MMHG | HEART RATE: 63 BPM | WEIGHT: 264.7 LBS | RESPIRATION RATE: 18 BRPM

## 2025-01-30 LAB
ANION GAP SERPL CALC-SCNC: 12 MMOL/L (ref 7–16)
APPEARANCE UR: CLEAR
BASOPHILS # BLD: 0.1 K/UL (ref 0–0.2)
BASOPHILS NFR BLD: 0.9 % (ref 0–2)
BILIRUB UR QL: NEGATIVE
BUN SERPL-MCNC: 23 MG/DL (ref 8–23)
CALCIUM SERPL-MCNC: 9.7 MG/DL (ref 8.8–10.2)
CHLORIDE SERPL-SCNC: 101 MMOL/L (ref 98–107)
CO2 SERPL-SCNC: 23 MMOL/L (ref 20–29)
COLOR UR: ABNORMAL
CREAT SERPL-MCNC: 1.14 MG/DL (ref 0.8–1.3)
DIFFERENTIAL METHOD BLD: ABNORMAL
EOSINOPHIL # BLD: 0.42 K/UL (ref 0–0.8)
EOSINOPHIL NFR BLD: 3.7 % (ref 0.5–7.8)
ERYTHROCYTE [DISTWIDTH] IN BLOOD BY AUTOMATED COUNT: 13.3 % (ref 11.9–14.6)
GLUCOSE SERPL-MCNC: 114 MG/DL (ref 70–99)
GLUCOSE UR STRIP.AUTO-MCNC: NEGATIVE MG/DL
HCT VFR BLD AUTO: 48.9 % (ref 41.1–50.3)
HGB BLD-MCNC: 17.4 G/DL (ref 13.6–17.2)
HGB UR QL STRIP: NEGATIVE
IMM GRANULOCYTES # BLD AUTO: 0.05 K/UL (ref 0–0.5)
IMM GRANULOCYTES NFR BLD AUTO: 0.4 % (ref 0–5)
INR PPP: 1.1
KETONES UR QL STRIP.AUTO: ABNORMAL MG/DL
LEUKOCYTE ESTERASE UR QL STRIP.AUTO: NEGATIVE
LYMPHOCYTES # BLD: 2.75 K/UL (ref 0.5–4.6)
LYMPHOCYTES NFR BLD: 24.4 % (ref 13–44)
MCH RBC QN AUTO: 34.8 PG (ref 26.1–32.9)
MCHC RBC AUTO-ENTMCNC: 35.6 G/DL (ref 31.4–35)
MCV RBC AUTO: 97.8 FL (ref 82–102)
MONOCYTES # BLD: 1.34 K/UL (ref 0.1–1.3)
MONOCYTES NFR BLD: 11.9 % (ref 4–12)
MRSA DNA SPEC QL NAA+PROBE: NOT DETECTED
NEUTS SEG # BLD: 6.6 K/UL (ref 1.7–8.2)
NEUTS SEG NFR BLD: 58.7 % (ref 43–78)
NITRITE UR QL STRIP.AUTO: NEGATIVE
NRBC # BLD: 0 K/UL (ref 0–0.2)
PH UR STRIP: 5.5 (ref 5–9)
PLATELET # BLD AUTO: 205 K/UL (ref 150–450)
PMV BLD AUTO: 11.4 FL (ref 9.4–12.3)
POTASSIUM SERPL-SCNC: 4.3 MMOL/L (ref 3.5–5.1)
PROT UR STRIP-MCNC: NEGATIVE MG/DL
PROTHROMBIN TIME: 14.2 SEC (ref 11.3–14.9)
RBC # BLD AUTO: 5 M/UL (ref 4.23–5.6)
S AUREUS CPE NOSE QL NAA+PROBE: NOT DETECTED
SODIUM SERPL-SCNC: 136 MMOL/L (ref 136–145)
SP GR UR REFRACTOMETRY: 1.01 (ref 1–1.02)
UROBILINOGEN UR QL STRIP.AUTO: 0.2 EU/DL (ref 0.2–1)
WBC # BLD AUTO: 11.3 K/UL (ref 4.3–11.1)

## 2025-01-30 PROCEDURE — 80048 BASIC METABOLIC PNL TOTAL CA: CPT

## 2025-01-30 PROCEDURE — 81003 URINALYSIS AUTO W/O SCOPE: CPT

## 2025-01-30 PROCEDURE — 87641 MR-STAPH DNA AMP PROBE: CPT

## 2025-01-30 PROCEDURE — 85025 COMPLETE CBC W/AUTO DIFF WBC: CPT

## 2025-01-30 PROCEDURE — 85610 PROTHROMBIN TIME: CPT

## 2025-01-30 RX ORDER — CLONIDINE HYDROCHLORIDE 0.1 MG/1
0.1 TABLET ORAL NIGHTLY
COMMUNITY

## 2025-01-30 RX ORDER — SPIRONOLACTONE 25 MG/1
25 TABLET ORAL DAILY
COMMUNITY

## 2025-01-30 RX ORDER — ATORVASTATIN CALCIUM 20 MG/1
20 TABLET, FILM COATED ORAL DAILY
COMMUNITY

## 2025-01-30 RX ORDER — ASCORBIC ACID 500 MG
1000 TABLET ORAL DAILY
COMMUNITY

## 2025-01-30 RX ORDER — CALCIUM CARBONATE 300MG(750)
1 TABLET,CHEWABLE ORAL DAILY
COMMUNITY

## 2025-01-30 RX ORDER — ALBUTEROL SULFATE 90 UG/1
2 INHALANT RESPIRATORY (INHALATION) EVERY 6 HOURS PRN
COMMUNITY

## 2025-01-30 RX ORDER — OMEPRAZOLE 40 MG/1
40 CAPSULE, DELAYED RELEASE ORAL NIGHTLY
COMMUNITY

## 2025-01-30 RX ORDER — BUDESONIDE, GLYCOPYRROLATE, AND FORMOTEROL FUMARATE 160; 9; 4.8 UG/1; UG/1; UG/1
2 AEROSOL, METERED RESPIRATORY (INHALATION) 2 TIMES DAILY
COMMUNITY

## 2025-01-30 RX ORDER — TAMSULOSIN HYDROCHLORIDE 0.4 MG/1
0.4 CAPSULE ORAL DAILY
COMMUNITY

## 2025-01-30 RX ORDER — LOSARTAN POTASSIUM 100 MG/1
100 TABLET ORAL DAILY
COMMUNITY

## 2025-01-30 RX ORDER — FERROUS SULFATE 325(65) MG
325 TABLET ORAL
COMMUNITY

## 2025-01-30 ASSESSMENT — PAIN DESCRIPTION - DIRECTION: RADIATING_TOWARDS: ARMS AND LEGS

## 2025-01-30 ASSESSMENT — PAIN DESCRIPTION - DESCRIPTORS: DESCRIPTORS: ACHING;TINGLING;NUMBNESS

## 2025-01-30 ASSESSMENT — PAIN DESCRIPTION - FREQUENCY: FREQUENCY: INTERMITTENT

## 2025-01-30 ASSESSMENT — PAIN SCALES - GENERAL: PAINLEVEL_OUTOF10: 7

## 2025-01-30 ASSESSMENT — PAIN DESCRIPTION - LOCATION: LOCATION: NECK

## 2025-01-30 ASSESSMENT — PAIN DESCRIPTION - PAIN TYPE: TYPE: CHRONIC PAIN

## 2025-01-30 NOTE — PRE-PROCEDURE INSTRUCTIONS
PLEASE CONTINUE TAKING ALL PRESCRIPTION MEDICATIONS UP TO THE DAY OF SURGERY UNLESS OTHERWISE DIRECTED BELOW. You may take Tylenol, allergy,  and/or indigestion medications.     TAKE ONLY THESE MEDICATIONS ON THE DAY OF SURGERY   Breztri inhaler  Bring Albuterol inhaler   Allopurinol  Atorvastatin   Duloxetine  Hydrocodone  Metoprolol  Montelukast  Tamsulosin        DISCONTINUE all vitamins and supplements 7 days prior to surgery. DISCONTINUE Non-Steroidal Anti-Inflammatory (NSAIDS) such as Advil and Aleve 5 days prior to surgery.     Home Medications to Hold- please continue all other medications except these.    Hold all vitamins and supplements 7 days prior to surgery  Hold Flexeril morning of surgery   Hold Losartan morning of surgery  Hold Spironolactone morning of surgery     Comments   Please drink 32 ounces of non-caffeinated clear liquids 2 hours prior to your arrival to avoid dehydration.     On the day before surgery please take 2 Tylenol or HYDROCODONE in the morning and then again before bed. You may use either regular or extra strength.           Please do not bring home medications with you on the day of surgery unless otherwise directed by your nurse.  If you are instructed to bring home medications, please give them to your nurse as they will be administered by the nursing staff.    If you have any questions, please call [facility] [phone].    A copy of this note was provided to the patient for reference.

## 2025-01-30 NOTE — PRE-PROCEDURE INSTRUCTIONS
Patient verified name, , and surgery as listed in [facility]. Patient provided medical/health information and PTA medications to the best of their ability.    TYPE  CASE: 2  Orders per surgeon: were received per protocol  Labs per surgeon: cbc w/ diff, bmp, ua, pt/inr and mrsa collected and results in Epic  Labs per anesthesia protocol: no additional  EKG:  not required per protocol. Patient denies any recent chest pain or CHAWLA     MRSA/MSSA swab collected; pharmacy to review and dose antibiotic as appropriate.     Patient provided with and instructed on education handouts including Guide to Surgery,  Preventing Infections, Pain Management, and [facility] [place].     Road to Recovery Spine surgery patient guide given. Instructed on incentive spirometry     Surgical skin cleanser and instructions given per hospital policy.    Original medication prescription bottles not visualized during patient appointment.     Patient teach back successful and patient demonstrates knowledge of instruction.

## 2025-02-03 ENCOUNTER — TELEPHONE (OUTPATIENT)
Dept: NEUROSURGERY | Age: 60
End: 2025-02-03

## 2025-02-03 NOTE — TELEPHONE ENCOUNTER
----- Message from Dr. Navjot White MD sent at 1/30/2025  9:17 AM EST -----  Natalia    We should have his elevated WBC (leukocytosis) evaluated by his primary care physician either with a repeat for further workup before proceeding with surgery.  Left ensured also that he is not taking steroids when his white blood cell count was elevated.    Navjot White MD, FAANS, FCNS   Neurosurgeon  [facility]  [facility]   1/30/2025 at 9:18 AM  ----- Message -----  From: Bibiana Zamora Incoming Lab For C[facility] Pdfs  Sent: 1/30/2025   8:51 AM EST  To: Navjot White MD

## 2025-02-03 NOTE — TELEPHONE ENCOUNTER
Vm left at 8:15 with patient- will route this message to his PCP prior to surgery on 2-13-25 ACDF for medical clearance due to abnormal WBC  All notes and labs in EPIC  Will fax notes and labs to Dr Taylor [phone]

## 2025-03-06 ENCOUNTER — HOSPITAL ENCOUNTER (OUTPATIENT)
Dept: SURGERY | Age: 60
Discharge: HOME | End: 2025-03-09
Payer: MEDICARE

## 2025-03-06 VITALS
DIASTOLIC BLOOD PRESSURE: 84 MMHG | HEIGHT: 74 IN | HEART RATE: 67 BPM | OXYGEN SATURATION: 94 % | SYSTOLIC BLOOD PRESSURE: 153 MMHG | WEIGHT: 264.9 LBS | TEMPERATURE: 97.7 F | RESPIRATION RATE: 18 BRPM | BODY MASS INDEX: 34 KG/M2

## 2025-03-06 LAB
ANION GAP SERPL CALC-SCNC: 9 MMOL/L (ref 7–16)
APPEARANCE UR: CLEAR
BASOPHILS # BLD: 0.12 K/UL (ref 0–0.2)
BASOPHILS NFR BLD: 1 % (ref 0–2)
BILIRUB UR QL: NEGATIVE
BUN SERPL-MCNC: 30 MG/DL (ref 8–23)
CALCIUM SERPL-MCNC: 9.8 MG/DL (ref 8.8–10.2)
CHLORIDE SERPL-SCNC: 104 MMOL/L (ref 98–107)
CO2 SERPL-SCNC: 24 MMOL/L (ref 20–29)
COLOR UR: ABNORMAL
CREAT SERPL-MCNC: 1.31 MG/DL (ref 0.8–1.3)
DIFFERENTIAL METHOD BLD: ABNORMAL
EOSINOPHIL # BLD: 0.37 K/UL (ref 0–0.8)
EOSINOPHIL NFR BLD: 3.2 % (ref 0.5–7.8)
ERYTHROCYTE [DISTWIDTH] IN BLOOD BY AUTOMATED COUNT: 13.9 % (ref 11.9–14.6)
GLUCOSE SERPL-MCNC: 114 MG/DL (ref 70–99)
GLUCOSE UR STRIP.AUTO-MCNC: NEGATIVE MG/DL
HCT VFR BLD AUTO: 49 % (ref 41.1–50.3)
HGB BLD-MCNC: 17.1 G/DL (ref 13.6–17.2)
HGB UR QL STRIP: NEGATIVE
IMM GRANULOCYTES # BLD AUTO: 0.05 K/UL (ref 0–0.5)
IMM GRANULOCYTES NFR BLD AUTO: 0.4 % (ref 0–5)
INR PPP: 1
KETONES UR QL STRIP.AUTO: ABNORMAL MG/DL
LEUKOCYTE ESTERASE UR QL STRIP.AUTO: NEGATIVE
LYMPHOCYTES # BLD: 2.18 K/UL (ref 0.5–4.6)
LYMPHOCYTES NFR BLD: 18.7 % (ref 13–44)
MCH RBC QN AUTO: 34.1 PG (ref 26.1–32.9)
MCHC RBC AUTO-ENTMCNC: 34.9 G/DL (ref 31.4–35)
MCV RBC AUTO: 97.8 FL (ref 82–102)
MONOCYTES # BLD: 1.29 K/UL (ref 0.1–1.3)
MONOCYTES NFR BLD: 11.1 % (ref 4–12)
NEUTS SEG # BLD: 7.62 K/UL (ref 1.7–8.2)
NEUTS SEG NFR BLD: 65.6 % (ref 43–78)
NITRITE UR QL STRIP.AUTO: NEGATIVE
NRBC # BLD: 0 K/UL (ref 0–0.2)
PH UR STRIP: 5.5 (ref 5–9)
PLATELET # BLD AUTO: 219 K/UL (ref 150–450)
PMV BLD AUTO: 11.6 FL (ref 9.4–12.3)
POTASSIUM SERPL-SCNC: 5.2 MMOL/L (ref 3.5–5.1)
PROT UR STRIP-MCNC: NEGATIVE MG/DL
PROTHROMBIN TIME: 13.2 SEC (ref 11.3–14.9)
RBC # BLD AUTO: 5.01 M/UL (ref 4.23–5.6)
SODIUM SERPL-SCNC: 137 MMOL/L (ref 136–145)
SP GR UR REFRACTOMETRY: 1.02 (ref 1–1.02)
UROBILINOGEN UR QL STRIP.AUTO: 0.2 EU/DL (ref 0.2–1)
WBC # BLD AUTO: 11.6 K/UL (ref 4.3–11.1)

## 2025-03-06 PROCEDURE — 85025 COMPLETE CBC W/AUTO DIFF WBC: CPT

## 2025-03-06 PROCEDURE — 87641 MR-STAPH DNA AMP PROBE: CPT

## 2025-03-06 PROCEDURE — 81003 URINALYSIS AUTO W/O SCOPE: CPT

## 2025-03-06 PROCEDURE — 80048 BASIC METABOLIC PNL TOTAL CA: CPT

## 2025-03-06 PROCEDURE — 80307 DRUG TEST PRSMV CHEM ANLYZR: CPT

## 2025-03-06 PROCEDURE — 85610 PROTHROMBIN TIME: CPT

## 2025-03-06 RX ORDER — POTASSIUM CHLORIDE 750 MG/1
TABLET, EXTENDED RELEASE ORAL DAILY
COMMUNITY
Start: 2025-01-28

## 2025-03-06 ASSESSMENT — PAIN SCALES - GENERAL: PAINLEVEL_OUTOF10: 3

## 2025-03-06 ASSESSMENT — PAIN DESCRIPTION - LOCATION: LOCATION: NECK

## 2025-03-06 NOTE — PERIOP NOTE
Patient verified name, , and surgery as listed in [facility]. Patient provided medical/health information and PTA medications to the best of their ability.    TYPE  CASE: 2  Orders per surgeon: were received  Labs per surgeon: cbc w/diff, bmp, pt/inr, ua, urine nicotine. Results: pending  Labs per anesthesia protocol: none  EKG:  not required per protocol     MRSA/MSSA swab collected; pharmacy to review and dose antibiotic as appropriate.     Patient provided with and instructed on education handouts including Guide to Surgery,  Preventing Infections, Pain Management, and [facility] brochure.     Road to Recovery Spine surgery patient guide given. Instructed on incentive spirometry with return demonstration. Patient viewed spine prehab video.     Surgical skin cleanser and instructions given per hospital policy.    Original medication prescription bottles not visualized during patient appointment.     Patient teach back successful and patient demonstrates knowledge of instruction.

## 2025-03-06 NOTE — PERIOP NOTE
K+ 5.2 sent to anesthesia for review. Labs automatically routed to ordering provider via Epic documentation.

## 2025-03-06 NOTE — PERIOP NOTE
PLEASE CONTINUE TAKING ALL PRESCRIPTION MEDICATIONS UP TO THE DAY OF SURGERY UNLESS OTHERWISE DIRECTED BELOW. You may take Tylenol, allergy,  and/or indigestion medications.     TAKE ONLY THESE MEDICATIONS ON THE DAY OF SURGERY    Allopurinol (Zyloprim)  Albuterol (Ventolin/ Pro-air) use and bring   Atorvastatin (Lipitor)  BREZTRI INHALER   Duloxetine (Cymbalta)   Metoprolol (Lopressor)  Montelukast (Singulair)  Omeprazole (Prilosec)  Tamsulosin (Flomax)     DISCONTINUE all vitamins and supplements 7 days prior to surgery. DISCONTINUE Non-Steroidal Anti-Inflammatory (NSAIDS) such as Advil and Aleve 5 days prior to surgery.     Home Medications to Hold- please continue all other medications except these.            Comments      On the day before surgery please take 2 Tylenol in the morning and then again before bed. You may use either regular or extra strength.           Please do not bring home medications with you on the day of surgery unless otherwise directed by your nurse.  If you are instructed to bring home medications, please give them to your nurse as they will be administered by the nursing staff.    If you have any questions, please call [facility] [phone].    A copy of this note was provided to the patient for reference.

## 2025-03-07 LAB
COMMENT:: NORMAL
COTININE UR QL SCN: NEGATIVE NG/ML
MRSA DNA SPEC QL NAA+PROBE: NOT DETECTED
S AUREUS CPE NOSE QL NAA+PROBE: NOT DETECTED

## 2025-03-10 ENCOUNTER — RESULTS FOLLOW-UP (OUTPATIENT)
Dept: SURGERY | Age: 60
End: 2025-03-10

## 2025-03-10 NOTE — TELEPHONE ENCOUNTER
Patient still has not heard from his PCP about clearance for high WBC- will refax labs and notes to Dr Taylor again to [phone] and the patient will contact his office

## 2025-03-10 NOTE — TELEPHONE ENCOUNTER
----- Message from Dr. Navjot White MD sent at 3/7/2025 11:26 AM EST -----  May need w/u by PCP prior to surgery patient with elevated WBC, check for recent high dose steroids.  ----- Message -----  From: Bibiana Zamora Incoming Lab For Copath Pdfs  Sent: 3/6/2025  11:58 AM EST  To: Navjot White MD

## 2025-03-24 ENCOUNTER — TELEPHONE (OUTPATIENT)
Dept: NEUROSURGERY | Age: 60
End: 2025-03-24

## 2025-03-24 NOTE — TELEPHONE ENCOUNTER
Called pt to let him know sx is cancelled and Zulma will call him back ASAP as his sx has been cancelled once already. Pt was in understanding.

## 2025-03-25 ENCOUNTER — TELEPHONE (OUTPATIENT)
Dept: NEUROSURGERY | Age: 60
End: 2025-03-25

## 2025-05-08 ENCOUNTER — TELEPHONE (OUTPATIENT)
Dept: NEUROSURGERY | Age: 60
End: 2025-05-08

## 2025-05-09 ENCOUNTER — PREP FOR PROCEDURE (OUTPATIENT)
Dept: NEUROSURGERY | Age: 60
End: 2025-05-09

## 2025-05-09 DIAGNOSIS — R29.898 WEAKNESS OF BOTH ARMS: ICD-10-CM

## 2025-05-12 ENCOUNTER — HOSPITAL ENCOUNTER (OUTPATIENT)
Dept: SURGERY | Age: 60
Discharge: HOME | End: 2025-05-15
Payer: MEDICARE

## 2025-05-12 VITALS
DIASTOLIC BLOOD PRESSURE: 96 MMHG | HEART RATE: 55 BPM | TEMPERATURE: 98.4 F | BODY MASS INDEX: 35.93 KG/M2 | SYSTOLIC BLOOD PRESSURE: 134 MMHG | HEIGHT: 72 IN | RESPIRATION RATE: 16 BRPM

## 2025-05-12 DIAGNOSIS — D72.821 MONOCYTOSIS: Primary | ICD-10-CM

## 2025-05-12 LAB
ANION GAP SERPL CALC-SCNC: 10 MMOL/L (ref 7–16)
APPEARANCE UR: CLEAR
BASOPHILS # BLD: 0.13 K/UL (ref 0–0.2)
BASOPHILS NFR BLD: 1.1 % (ref 0–2)
BILIRUB UR QL: NEGATIVE
BUN SERPL-MCNC: 24 MG/DL (ref 8–23)
CALCIUM SERPL-MCNC: 10 MG/DL (ref 8.8–10.2)
CHLORIDE SERPL-SCNC: 104 MMOL/L (ref 98–107)
CO2 SERPL-SCNC: 23 MMOL/L (ref 20–29)
COLOR UR: NORMAL
CREAT SERPL-MCNC: 1.13 MG/DL (ref 0.8–1.3)
DIFFERENTIAL METHOD BLD: ABNORMAL
EKG ATRIAL RATE: 56 BPM
EKG DIAGNOSIS: NORMAL
EKG P AXIS: 25 DEGREES
EKG P-R INTERVAL: 182 MS
EKG Q-T INTERVAL: 404 MS
EKG QRS DURATION: 92 MS
EKG QTC CALCULATION (BAZETT): 389 MS
EKG R AXIS: 63 DEGREES
EKG T AXIS: 56 DEGREES
EKG VENTRICULAR RATE: 56 BPM
EOSINOPHIL # BLD: 0.45 K/UL (ref 0–0.8)
EOSINOPHIL NFR BLD: 3.8 % (ref 0.5–7.8)
ERYTHROCYTE [DISTWIDTH] IN BLOOD BY AUTOMATED COUNT: 13.7 % (ref 11.9–14.6)
GLUCOSE SERPL-MCNC: 112 MG/DL (ref 70–99)
GLUCOSE UR STRIP.AUTO-MCNC: NEGATIVE MG/DL
HCT VFR BLD AUTO: 53.5 % (ref 41.1–50.3)
HGB BLD-MCNC: 17.9 G/DL (ref 13.6–17.2)
HGB UR QL STRIP: NEGATIVE
IMM GRANULOCYTES # BLD AUTO: 0.04 K/UL (ref 0–0.5)
IMM GRANULOCYTES NFR BLD AUTO: 0.3 % (ref 0–5)
INR PPP: 1
KETONES UR QL STRIP.AUTO: NEGATIVE MG/DL
LEUKOCYTE ESTERASE UR QL STRIP.AUTO: NEGATIVE
LYMPHOCYTES # BLD: 2.49 K/UL (ref 0.5–4.6)
LYMPHOCYTES NFR BLD: 20.9 % (ref 13–44)
MCH RBC QN AUTO: 33.1 PG (ref 26.1–32.9)
MCHC RBC AUTO-ENTMCNC: 33.5 G/DL (ref 31.4–35)
MCV RBC AUTO: 99.1 FL (ref 82–102)
MONOCYTES # BLD: 1.31 K/UL (ref 0.1–1.3)
MONOCYTES NFR BLD: 11 % (ref 4–12)
MRSA DNA SPEC QL NAA+PROBE: NOT DETECTED
NEUTS SEG # BLD: 7.52 K/UL (ref 1.7–8.2)
NEUTS SEG NFR BLD: 62.9 % (ref 43–78)
NITRITE UR QL STRIP.AUTO: NEGATIVE
NRBC # BLD: 0 K/UL (ref 0–0.2)
PH UR STRIP: 5.5 (ref 5–9)
PLATELET # BLD AUTO: 278 K/UL (ref 150–450)
PMV BLD AUTO: 11.4 FL (ref 9.4–12.3)
POTASSIUM SERPL-SCNC: 4.6 MMOL/L (ref 3.5–5.1)
PROT UR STRIP-MCNC: NEGATIVE MG/DL
PROTHROMBIN TIME: 13.2 SEC (ref 11.3–14.9)
RBC # BLD AUTO: 5.4 M/UL (ref 4.23–5.6)
S AUREUS CPE NOSE QL NAA+PROBE: NOT DETECTED
SODIUM SERPL-SCNC: 137 MMOL/L (ref 136–145)
SP GR UR REFRACTOMETRY: 1.02 (ref 1–1.02)
UROBILINOGEN UR QL STRIP.AUTO: 0.2 EU/DL (ref 0.2–1)
WBC # BLD AUTO: 11.9 K/UL (ref 4.3–11.1)

## 2025-05-12 PROCEDURE — 93010 ELECTROCARDIOGRAM REPORT: CPT | Performed by: INTERNAL MEDICINE

## 2025-05-12 PROCEDURE — 85610 PROTHROMBIN TIME: CPT

## 2025-05-12 PROCEDURE — 93005 ELECTROCARDIOGRAM TRACING: CPT | Performed by: ANESTHESIOLOGY

## 2025-05-12 PROCEDURE — 85025 COMPLETE CBC W/AUTO DIFF WBC: CPT

## 2025-05-12 PROCEDURE — 80048 BASIC METABOLIC PNL TOTAL CA: CPT

## 2025-05-12 PROCEDURE — 81003 URINALYSIS AUTO W/O SCOPE: CPT

## 2025-05-12 PROCEDURE — 87641 MR-STAPH DNA AMP PROBE: CPT

## 2025-05-12 PROCEDURE — 80307 DRUG TEST PRSMV CHEM ANLYZR: CPT

## 2025-05-12 ASSESSMENT — PAIN SCALES - GENERAL: PAINLEVEL_OUTOF10: 5

## 2025-05-12 ASSESSMENT — PAIN DESCRIPTION - ORIENTATION: ORIENTATION: LEFT;RIGHT;LOWER;POSTERIOR

## 2025-05-12 NOTE — PERIOP NOTE
Patient verified name and     Order for consent NOT found in EHR at time of PAT visit. Unable to verify case posting against order; surgery verified by patient.    Type 2 surgery, walk-in assessment complete.    Labs per surgeon: CBC w/diff, BMP, UA, PT/INR, Urine nicotene; results pending  Labs per anesthesia protocol: T/S held DOS  [id number] WDL of anesthesia protocol        Patient provided with and instructed on educational handouts including Guide to Surgery, Preventing Surgical Site Infections, Pain Management, and [place] [facility] Brochure.    Patient answered medical/surgical history questions at their best of ability. All prior to admission medications documented in EPIC. Original medication prescription bottle was not visualized during patient appointment.     Patient instructed to hold all vitamins 7 days prior to surgery and NSAIDS 5 days prior to surgery, patient verbalized understanding.     Patient teach back successful and patient demonstrates knowledge of instructions.     PLEASE CONTINUE TAKING ALL PRESCRIPTION MEDICATIONS UP TO THE DAY OF SURGERY UNLESS OTHERWISE DIRECTED BELOW. You may take Tylenol, allergy,  and/or indigestion medications.     TAKE ONLY THESE MEDICATIONS ON THE DAY OF SURGERY   Hydrocodone-acetaminophen ([place]) if needed  Montelukast (Singulair)  Metoprolol (Lopressor)     ALBUTEROL-BUDESONIDE inhaler use and bring  Allopurinol (Zyloprim)  BREZTRI inhaler   Atorvastatin (Lipitor)  Tamsulosin (Flomax)       DISCONTINUE all vitamins and supplements now. DISCONTINUE Non-Steroidal Anti-Inflammatory (NSAIDS) such as Advil and Aleve now.     Home Medications to Hold- please continue all other medications except these.    Losartan (Cozaar) - hold day of surgery     Spironolactone (Aldactone) - hold day of surgery  Potassium chloride (KLOR-CON) - hold day of surgery  FLEXERIL - hold day of surgery       Comments   ELYSIA-HEX shower the night before surgery and the morning of

## 2025-05-12 NOTE — PERIOP NOTE
Labs within anesthesia guidelines, no follow-up required. Labs automatically routed to ordering provider via Epic documentation. Abnormal WBC results routed to Dr. White and also message left for Jia in office.

## 2025-05-13 LAB
COMMENT:: NORMAL
COTININE UR QL SCN: NEGATIVE NG/ML

## 2025-05-13 NOTE — PROGRESS NOTES
mmol/L    Potassium 4.6 3.5 - 5.1 mmol/L    Chloride 104 98 - 107 mmol/L    CO2 23 20 - 29 mmol/L    Anion Gap 10 7 - 16 mmol/L    Glucose 112 (H) 70 - 99 mg/dL    BUN 24 (H) 8 - 23 MG/DL    Creatinine 1.13 0.80 - 1.30 MG/DL    Est, Glom Filt Rate 74 >60 ml/min/1.73m2    Calcium 10.0 8.8 - 10.2 MG/DL   Urinalysis    Collection Time: 05/12/25 10:57 AM   Result Value Ref Range    Color, UA YELLOW/STRAW      Appearance CLEAR      Specific Gravity, UA 1.017 1.001 - 1.023      pH, Urine 5.5 5.0 - 9.0      Protein, UA Negative NEG mg/dL    Glucose, Ur Negative NEG mg/dL    Ketones, Urine Negative NEG mg/dL    Bilirubin, Urine Negative NEG      Blood, Urine Negative NEG      Urobilinogen, Urine 0.2 0.2 - 1.0 EU/dL    Nitrite, Urine Negative NEG      Leukocyte Esterase, Urine Negative NEG     Protime-INR    Collection Time: 05/12/25 10:57 AM   Result Value Ref Range    Protime 13.2 11.3 - 14.9 sec    INR 1.0     NICOTINE AND METABOLITES, URINE    Collection Time: 05/12/25 10:57 AM   Result Value Ref Range    Cotinine Screen, Ur Negative Pzxjiq=365 ng/mL    Comment: Comment     MRSA/Staph Aureas DNA    Collection Time: 05/12/25 10:57 AM    Specimen: Nasal Swab   Result Value Ref Range    MRSA by PCR Not detected NOTD      SA by PCR Not detected NOTD     AFP Tumor Marker    Collection Time: 05/14/25  9:56 AM   Result Value Ref Range    AFP-Tumor Marker <1.82 0.00 - 8.30 ng/mL   Comprehensive Metabolic Panel    Collection Time: 05/14/25  9:56 AM   Result Value Ref Range    Sodium 137 136 - 145 mmol/L    Potassium 4.9 3.5 - 5.1 mmol/L    Chloride 103 98 - 107 mmol/L    CO2 23 20 - 29 mmol/L    Anion Gap 11 7 - 16 mmol/L    Glucose 129 (H) 70 - 99 mg/dL    BUN 30 (H) 8 - 23 MG/DL    Creatinine 1.20 0.80 - 1.30 MG/DL    Est, Glom Filt Rate 69 >60 ml/min/1.73m2    Calcium 10.2 8.8 - 10.2 MG/DL    Total Bilirubin 0.4 0.0 - 1.2 MG/DL    ALT 20 8 - 55 U/L    AST 26 15 - 37 U/L    Alk Phosphatase 81 40 - 129 U/L    Total Protein 7.8

## 2025-05-13 NOTE — PROGRESS NOTES
NEW PATIENT ABSTRACT      Referral Diagnosis: Monocytosis    Referring Provider: Navjot White MD    Primary Care Provider: Marlon Taylor Jr., MD    Presenting Symptoms: neck pain, bilateral upper extremity pain, bilateral extremity numbness and tingling and chronic low back pain    Family History of Cancer: Cancer-related family history includes Cancer in his mother.    Past Medical History:   Past Medical History:   Diagnosis Date    Acromioclavicular joint arthritis     left shoulder    Allergic rhinitis 8/11/2016    Arrhythmia     heart rate sometimes increases     Arthritis     Cervical radiculopathy 8/11/2016    Chronic pain     neck and lower back    COPD (chronic obstructive pulmonary disease) (HCC) 08/11/2016    managed with inhalers    Deficiency, lipoprotein 8/11/2016    Depression 8/11/2016    Disorders of bursae and tendons in shoulder region, unspecified 12/23/2011    Dyslipidemia 8/11/2016    Dyspnea 8/11/2016    ED (erectile dysfunction) 8/11/2016    Encounter for long-term (current) use of medications 8/11/2016    Encounter for prostate cancer screening 8/11/2016    Gout 8/11/2016    managed with med    Hypercholesterolemia     Hypertension     controlled with meds    Insomnia     Leg pain 8/11/2016    Lumbar back pain 8/11/2016    Metabolic syndrome 8/11/2016    SHEY on CPAP     Primary localized osteoarthrosis, shoulder region 11/10/2011    RA (rheumatoid arthritis) ([facility])     Seasonal allergies     Sprain and strain of other specified sites of shoulder and upper arm 11/10/2011    Superior glenoid labrum lesion 11/10/2011    Supraspinatus (muscle) (tendon) sprain 11/10/2011    Unspecified adverse effect of anesthesia 2011    difficulty breathing prior surgery after block and after .        Chronological History of Pertinent Events (From Onset of Presenting Symptoms):    1/6/25:  Pt seen in office by Dr. White ([facility]) for neck pain, bilateral upper extremity pain,

## 2025-05-14 ENCOUNTER — OFFICE VISIT (OUTPATIENT)
Dept: ONCOLOGY | Age: 60
End: 2025-05-14
Payer: MEDICARE

## 2025-05-14 ENCOUNTER — HOSPITAL ENCOUNTER (OUTPATIENT)
Dept: LAB | Age: 60
Discharge: HOME | End: 2025-05-14
Payer: MEDICARE

## 2025-05-14 ENCOUNTER — RESULTS FOLLOW-UP (OUTPATIENT)
Dept: ONCOLOGY | Age: 60
End: 2025-05-14

## 2025-05-14 VITALS
SYSTOLIC BLOOD PRESSURE: 141 MMHG | TEMPERATURE: 97.6 F | OXYGEN SATURATION: 96 % | BODY MASS INDEX: 35.49 KG/M2 | RESPIRATION RATE: 16 BRPM | HEIGHT: 72 IN | DIASTOLIC BLOOD PRESSURE: 70 MMHG | HEART RATE: 61 BPM | WEIGHT: 262 LBS

## 2025-05-14 DIAGNOSIS — R77.1 ELEVATED SERUM GLOBULIN LEVEL: ICD-10-CM

## 2025-05-14 DIAGNOSIS — Z79.890 LONG-TERM CURRENT USE OF TESTOSTERONE REPLACEMENT THERAPY: ICD-10-CM

## 2025-05-14 DIAGNOSIS — D72.821 MONOCYTOSIS: ICD-10-CM

## 2025-05-14 DIAGNOSIS — M79.671 RIGHT FOOT PAIN: Primary | ICD-10-CM

## 2025-05-14 DIAGNOSIS — D75.1 ERYTHROCYTOSIS: ICD-10-CM

## 2025-05-14 DIAGNOSIS — S99.921S INJURY OF RIGHT FOOT, SEQUELA: ICD-10-CM

## 2025-05-14 DIAGNOSIS — F10.90 ALCOHOL USE: ICD-10-CM

## 2025-05-14 DIAGNOSIS — D72.821 MONOCYTOSIS: Primary | ICD-10-CM

## 2025-05-14 PROBLEM — S99.921A INJURY OF RIGHT FOOT: Status: ACTIVE | Noted: 2025-05-14

## 2025-05-14 LAB
AFP-TM SERPL-MCNC: <1.82 NG/ML (ref 0–8.3)
ALBUMIN SERPL-MCNC: 3.9 G/DL (ref 3.2–4.6)
ALBUMIN/GLOB SERPL: 1 (ref 1–1.9)
ALP SERPL-CCNC: 81 U/L (ref 40–129)
ALT SERPL-CCNC: 20 U/L (ref 8–55)
ANION GAP SERPL CALC-SCNC: 11 MMOL/L (ref 7–16)
AST SERPL-CCNC: 26 U/L (ref 15–37)
BASOPHILS # BLD: 0.11 K/UL (ref 0–0.2)
BASOPHILS NFR BLD: 0.8 % (ref 0–2)
BILIRUB SERPL-MCNC: 0.4 MG/DL (ref 0–1.2)
BUN SERPL-MCNC: 30 MG/DL (ref 8–23)
CALCIUM SERPL-MCNC: 10.2 MG/DL (ref 8.8–10.2)
CHLORIDE SERPL-SCNC: 103 MMOL/L (ref 98–107)
CO2 SERPL-SCNC: 23 MMOL/L (ref 20–29)
COLLECTION INFORMATION: NORMAL
CREAT SERPL-MCNC: 1.2 MG/DL (ref 0.8–1.3)
DATE SENT TO REF LAB: NORMAL
DIFFERENTIAL METHOD BLD: ABNORMAL
EOSINOPHIL # BLD: 0.41 K/UL (ref 0–0.8)
EOSINOPHIL NFR BLD: 3 % (ref 0.5–7.8)
ERYTHROCYTE [DISTWIDTH] IN BLOOD BY AUTOMATED COUNT: 14 % (ref 11.9–14.6)
GLOBULIN SER CALC-MCNC: 4 G/DL (ref 2.3–3.5)
GLUCOSE SERPL-MCNC: 129 MG/DL (ref 70–99)
HCT VFR BLD AUTO: 52.8 % (ref 41.1–50.3)
HGB BLD-MCNC: 18.2 G/DL (ref 13.6–17.2)
IMM GRANULOCYTES # BLD AUTO: 0.06 K/UL (ref 0–0.5)
IMM GRANULOCYTES NFR BLD AUTO: 0.4 % (ref 0–5)
LYMPHOCYTES # BLD: 2.65 K/UL (ref 0.5–4.6)
LYMPHOCYTES NFR BLD: 19.1 % (ref 13–44)
Lab: NORMAL
MCH RBC QN AUTO: 33.8 PG (ref 26.1–32.9)
MCHC RBC AUTO-ENTMCNC: 34.5 G/DL (ref 31.4–35)
MCV RBC AUTO: 98 FL (ref 82–102)
MONOCYTES # BLD: 1.4 K/UL (ref 0.1–1.3)
MONOCYTES NFR BLD: 10.1 % (ref 4–12)
NEUTS SEG # BLD: 9.22 K/UL (ref 1.7–8.2)
NEUTS SEG NFR BLD: 66.6 % (ref 43–78)
NRBC # BLD: 0 K/UL (ref 0–0.2)
PLATELET # BLD AUTO: 262 K/UL (ref 150–450)
PMV BLD AUTO: 11.3 FL (ref 9.4–12.3)
POTASSIUM SERPL-SCNC: 4.9 MMOL/L (ref 3.5–5.1)
PROT SERPL-MCNC: 7.8 G/DL (ref 6.3–8.2)
RBC # BLD AUTO: 5.39 M/UL (ref 4.23–5.6)
SODIUM SERPL-SCNC: 137 MMOL/L (ref 136–145)
WBC # BLD AUTO: 13.9 K/UL (ref 4.3–11.1)

## 2025-05-14 PROCEDURE — G8427 DOCREV CUR MEDS BY ELIG CLIN: HCPCS | Performed by: INTERNAL MEDICINE

## 2025-05-14 PROCEDURE — 1036F TOBACCO NON-USER: CPT | Performed by: INTERNAL MEDICINE

## 2025-05-14 PROCEDURE — 3017F COLORECTAL CA SCREEN DOC REV: CPT | Performed by: INTERNAL MEDICINE

## 2025-05-14 PROCEDURE — 82105 ALPHA-FETOPROTEIN SERUM: CPT

## 2025-05-14 PROCEDURE — G8417 CALC BMI ABV UP PARAM F/U: HCPCS | Performed by: INTERNAL MEDICINE

## 2025-05-14 PROCEDURE — 85025 COMPLETE CBC W/AUTO DIFF WBC: CPT

## 2025-05-14 PROCEDURE — 99204 OFFICE O/P NEW MOD 45 MIN: CPT | Performed by: INTERNAL MEDICINE

## 2025-05-14 PROCEDURE — 0077U IG PARAPROTEIN QUAL BLD/UR: CPT

## 2025-05-14 PROCEDURE — 36415 COLL VENOUS BLD VENIPUNCTURE: CPT

## 2025-05-14 PROCEDURE — 82668 ASSAY OF ERYTHROPOIETIN: CPT

## 2025-05-14 PROCEDURE — 3078F DIAST BP <80 MM HG: CPT | Performed by: INTERNAL MEDICINE

## 2025-05-14 PROCEDURE — 80053 COMPREHEN METABOLIC PANEL: CPT

## 2025-05-14 PROCEDURE — 82784 ASSAY IGA/IGD/IGG/IGM EACH: CPT

## 2025-05-14 PROCEDURE — 3077F SYST BP >= 140 MM HG: CPT | Performed by: INTERNAL MEDICINE

## 2025-05-14 PROCEDURE — 83521 IG LIGHT CHAINS FREE EACH: CPT

## 2025-05-14 ASSESSMENT — ENCOUNTER SYMPTOMS
DIARRHEA: 0
EYE PROBLEMS: 0
ABDOMINAL DISTENTION: 0
BACK PAIN: 1
HEMOPTYSIS: 0
COUGH: 0
NAUSEA: 0
ABDOMINAL PAIN: 0
SCLERAL ICTERUS: 0
SORE THROAT: 0
SHORTNESS OF BREATH: 0
CONSTIPATION: 0
VOMITING: 0
BLOOD IN STOOL: 0

## 2025-05-14 ASSESSMENT — PATIENT HEALTH QUESTIONNAIRE - PHQ9
SUM OF ALL RESPONSES TO PHQ QUESTIONS 1-9: 0
2. FEELING DOWN, DEPRESSED OR HOPELESS: NOT AT ALL
SUM OF ALL RESPONSES TO PHQ QUESTIONS 1-9: 0
1. LITTLE INTEREST OR PLEASURE IN DOING THINGS: NOT AT ALL

## 2025-05-14 NOTE — PATIENT INSTRUCTIONS
Patient Information from Today's Visit    Diagnosis: Monocytosis      Follow Up Instructions: After workup.    History reviewed.  Symptoms reviewed.  Recommend holding off on testosterone injections at this time.    Treatment Summary has been discussed and given to patient: N/A      Current Labs: N/A            Please refer to After Visit Summary or MyChart for upcoming appointment information. If you have any questions regarding your upcoming schedule please reach out to your care team through [employer] or call [phone].    Please notify your assigned Nurse Navigator of any unplanned hospital admissions or Emergency Room visits within 24 hours of discharge.    -------------------------------------------------------------------------------------------------------------------  Please call our office at [phone] if you have any  of the following symptoms:   Fever of 100.5 or greater  Chills  Shortness of breath  Swelling or pain in one leg    After office hours an answering service is available and will contact a provider for emergencies or if you are experiencing any of the above symptoms.        MARÍA SCOTT RN

## 2025-05-15 LAB
KAPPA LC FREE SER-MCNC: 43.1 MG/L (ref 2.4–20.7)
KAPPA LC FREE/LAMBDA FREE SER: 1.9 (ref 0.2–0.8)
LAMBDA LC FREE SERPL-MCNC: 22.7 MG/L (ref 4.2–27.7)

## 2025-05-16 LAB
EPO SERPL-ACNC: 7 MIU/ML (ref 2.6–18.5)
FLOW CYTOMETRY RESULTS: NORMAL
SPECIMEN SOURCE: NORMAL
TEST ORDERED: NORMAL

## 2025-05-19 LAB — IMMUNOLOGIST REVIEW: NORMAL

## 2025-05-20 LAB — PATH REV BLD -IMP: NORMAL

## 2025-05-22 NOTE — PROGRESS NOTES
Patient has requested an audio/video evaluation for the following concern(s):    Pt was evaluated through a synchronous (real-time) audio-video encounter. The patient (or guardian if applicable) is aware that this is a billable service, which includes applicable co-pays. This Virtual Visit was conducted with patient's (and/or legal guardian's) consent. The visit was conducted pursuant to the emergency declaration under the [place] Act and the National Emergencies Act, 1135 waiver authority and the Coronavirus Preparedness and Response Supplemental Appropriations Act.  Patient identification was verified, and a caregiver was present when appropriate.   The patient was located at Other: home  Provider was located at Facility (Appt Dept): [address] [place] [address] Dr Gil [address] [place] [id number].     Total time spent on this encounter: 21min [chart review, VV, communication with Dr White, and charting]    [facility]: Established patient - follow up   Chief Complaint   Patient presents with    Follow-up     Referral Diagnosis: Monocytosis  Referring Provider: Navjot White MD  Primary Care Provider: Marlon Taylor Jr., MD  Presenting Symptoms: neck pain, bilateral upper extremity pain, bilateral extremity numbness and tingling and chronic low back pain  Family History of Cancer: Cancer-related family history includes Cancer in his mother.    History of Present Illness:  Mr. Pittman is a 59 y.o. male who presents today for follow up regarding monocytosis.  The past medical history is significant for allergic rhinitis, arrythmia, arthritis, HLD, COPD, depression, obesity, and SHEY/CPAP.   - 5/14/25 - The patient is here for consultation regarding polycythemia and monocytosis.  He is planning sx with Dr White.  He reports no recent history of smoking or vaping, with only rare exposure to secondhand smoke. He has been on a regimen of testosterone injections every 2 weeks for the past 2 to 3 years,

## 2025-05-30 ENCOUNTER — TELEMEDICINE (OUTPATIENT)
Dept: ONCOLOGY | Age: 60
End: 2025-05-30
Payer: MEDICARE

## 2025-05-30 DIAGNOSIS — S99.921S INJURY OF RIGHT FOOT, SEQUELA: ICD-10-CM

## 2025-05-30 DIAGNOSIS — Z79.890 LONG-TERM CURRENT USE OF TESTOSTERONE REPLACEMENT THERAPY: ICD-10-CM

## 2025-05-30 DIAGNOSIS — D72.821 MONOCYTOSIS: Primary | ICD-10-CM

## 2025-05-30 DIAGNOSIS — M54.2 NECK PAIN: ICD-10-CM

## 2025-05-30 DIAGNOSIS — M54.12 CERVICAL RADICULOPATHY: ICD-10-CM

## 2025-05-30 PROCEDURE — 99213 OFFICE O/P EST LOW 20 MIN: CPT | Performed by: INTERNAL MEDICINE

## 2025-05-30 PROCEDURE — G8427 DOCREV CUR MEDS BY ELIG CLIN: HCPCS | Performed by: INTERNAL MEDICINE

## 2025-05-30 PROCEDURE — 3017F COLORECTAL CA SCREEN DOC REV: CPT | Performed by: INTERNAL MEDICINE

## 2025-05-30 ASSESSMENT — PATIENT HEALTH QUESTIONNAIRE - PHQ9
SUM OF ALL RESPONSES TO PHQ QUESTIONS 1-9: 0
SUM OF ALL RESPONSES TO PHQ QUESTIONS 1-9: 0
1. LITTLE INTEREST OR PLEASURE IN DOING THINGS: NOT AT ALL
2. FEELING DOWN, DEPRESSED OR HOPELESS: NOT AT ALL
SUM OF ALL RESPONSES TO PHQ QUESTIONS 1-9: 0
SUM OF ALL RESPONSES TO PHQ QUESTIONS 1-9: 0

## 2025-05-30 NOTE — PATIENT INSTRUCTIONS
interference as defined by Roche. Value may be false increased.     Chloride 05/12/2025 104  98 - 107 mmol/L Final    CO2 05/12/2025 23  20 - 29 mmol/L Final    Anion Gap 05/12/2025 10  7 - 16 mmol/L Final    Glucose 05/12/2025 112 (H)  70 - 99 mg/dL Final    Comment: <70 mg/dL Consistent with, but not fully diagnostic of hypoglycemia.  100 - 125 mg/dL Impaired fasting glucose/consistent with pre-diabetes mellitus.  > 126 mg/dl Fasting glucose consistent with overt diabetes mellitus      BUN 05/12/2025 24 (H)  8 - 23 MG/DL Final    Creatinine 05/12/2025 1.13  0.80 - 1.30 MG/DL Final    Est, Glom Filt Rate 05/12/2025 74  >60 ml/min/1.73m2 Final    Comment:    Pediatric calculator link: https://www.kidney.org/professionals/kdoqi/gfr_calculatorped     These results are not intended for use in patients <18 years of age.     eGFR results are calculated without a race factor using  the 2021 CKD-EPI equation. Careful clinical correlation is recommended, particularly when comparing to results calculated using previous equations.  The CKD-EPI equation is less accurate in patients with extremes of muscle mass, extra-renal metabolism of creatinine, excessive creatine ingestion, or following therapy that affects renal tubular secretion.      Calcium 05/12/2025 10.0  8.8 - 10.2 MG/DL Final    Color, UA 05/12/2025 YELLOW/STRAW    Final    Color Reference Range: Straw, Yellow or Dark Yellow    Appearance 05/12/2025 CLEAR    Final    Specific Gravity, UA 05/12/2025 1.017  1.001 - 1.023   Final    pH, Urine 05/12/2025 5.5  5.0 - 9.0   Final    Protein, UA 05/12/2025 Negative  NEG mg/dL Final    Glucose, Ur 05/12/2025 Negative  NEG mg/dL Final    Ketones, Urine 05/12/2025 Negative  NEG mg/dL Final    Bilirubin, Urine 05/12/2025 Negative  NEG   Final    Blood, Urine 05/12/2025 Negative  NEG   Final    Urobilinogen, Urine 05/12/2025 0.2  0.2 - 1.0 EU/dL Final    Nitrite, Urine 05/12/2025 Negative  NEG   Final    Leukocyte Esterase,

## 2025-06-09 ENCOUNTER — PREP FOR PROCEDURE (OUTPATIENT)
Dept: NEUROSURGERY | Age: 60
End: 2025-06-09

## 2025-06-09 DIAGNOSIS — R20.0 NUMBNESS AND TINGLING OF BOTH UPPER EXTREMITIES WHILE SLEEPING: ICD-10-CM

## 2025-06-09 DIAGNOSIS — R20.2 NUMBNESS AND TINGLING OF BOTH UPPER EXTREMITIES WHILE SLEEPING: ICD-10-CM

## 2025-06-11 ENCOUNTER — HOSPITAL ENCOUNTER (OUTPATIENT)
Dept: SURGERY | Age: 60
Discharge: HOME | End: 2025-06-14
Payer: MEDICARE

## 2025-06-11 VITALS
HEIGHT: 73 IN | TEMPERATURE: 97.5 F | RESPIRATION RATE: 16 BRPM | SYSTOLIC BLOOD PRESSURE: 142 MMHG | DIASTOLIC BLOOD PRESSURE: 84 MMHG | HEART RATE: 56 BPM | OXYGEN SATURATION: 94 % | BODY MASS INDEX: 34.62 KG/M2 | WEIGHT: 261.2 LBS

## 2025-06-11 LAB
ANION GAP SERPL CALC-SCNC: 16 MMOL/L (ref 7–16)
APPEARANCE UR: CLEAR
BASOPHILS # BLD: 0.1 K/UL (ref 0–0.2)
BASOPHILS NFR BLD: 0.9 % (ref 0–2)
BILIRUB UR QL: NEGATIVE
BUN SERPL-MCNC: 21 MG/DL (ref 8–23)
CALCIUM SERPL-MCNC: 10.1 MG/DL (ref 8.8–10.2)
CHLORIDE SERPL-SCNC: 100 MMOL/L (ref 98–107)
CO2 SERPL-SCNC: 23 MMOL/L (ref 20–29)
COLOR UR: NORMAL
CREAT SERPL-MCNC: 1.06 MG/DL (ref 0.8–1.3)
DIFFERENTIAL METHOD BLD: NORMAL
EOSINOPHIL # BLD: 0.39 K/UL (ref 0–0.8)
EOSINOPHIL NFR BLD: 3.5 % (ref 0.5–7.8)
ERYTHROCYTE [DISTWIDTH] IN BLOOD BY AUTOMATED COUNT: 13.1 % (ref 11.9–14.6)
GLUCOSE SERPL-MCNC: 116 MG/DL (ref 70–99)
GLUCOSE UR STRIP.AUTO-MCNC: NEGATIVE MG/DL
HCT VFR BLD AUTO: 49.8 % (ref 41.1–50.3)
HGB BLD-MCNC: 17 G/DL (ref 13.6–17.2)
HGB UR QL STRIP: NEGATIVE
IMM GRANULOCYTES # BLD AUTO: 0.03 K/UL (ref 0–0.5)
IMM GRANULOCYTES NFR BLD AUTO: 0.3 % (ref 0–5)
INR PPP: 1
KETONES UR QL STRIP.AUTO: NEGATIVE MG/DL
LEUKOCYTE ESTERASE UR QL STRIP.AUTO: NEGATIVE
LYMPHOCYTES # BLD: 2.41 K/UL (ref 0.5–4.6)
LYMPHOCYTES NFR BLD: 21.9 % (ref 13–44)
MCH RBC QN AUTO: 32.9 PG (ref 26.1–32.9)
MCHC RBC AUTO-ENTMCNC: 34.1 G/DL (ref 31.4–35)
MCV RBC AUTO: 96.5 FL (ref 82–102)
MONOCYTES # BLD: 1.19 K/UL (ref 0.1–1.3)
MONOCYTES NFR BLD: 10.8 % (ref 4–12)
MRSA DNA SPEC QL NAA+PROBE: NOT DETECTED
NEUTS SEG # BLD: 6.87 K/UL (ref 1.7–8.2)
NEUTS SEG NFR BLD: 62.6 % (ref 43–78)
NITRITE UR QL STRIP.AUTO: NEGATIVE
NRBC # BLD: 0 K/UL (ref 0–0.2)
PH UR STRIP: 5.5 (ref 5–9)
PLATELET # BLD AUTO: 236 K/UL (ref 150–450)
PMV BLD AUTO: 11.8 FL (ref 9.4–12.3)
POTASSIUM SERPL-SCNC: 4.9 MMOL/L (ref 3.5–5.1)
PROT UR STRIP-MCNC: NEGATIVE MG/DL
PROTHROMBIN TIME: 13.2 SEC (ref 11.3–14.9)
RBC # BLD AUTO: 5.16 M/UL (ref 4.23–5.6)
S AUREUS CPE NOSE QL NAA+PROBE: NOT DETECTED
SODIUM SERPL-SCNC: 139 MMOL/L (ref 136–145)
SP GR UR REFRACTOMETRY: 1.01 (ref 1–1.02)
UROBILINOGEN UR QL STRIP.AUTO: 0.2 EU/DL (ref 0.2–1)
WBC # BLD AUTO: 11 K/UL (ref 4.3–11.1)

## 2025-06-11 PROCEDURE — 85025 COMPLETE CBC W/AUTO DIFF WBC: CPT

## 2025-06-11 PROCEDURE — 80048 BASIC METABOLIC PNL TOTAL CA: CPT

## 2025-06-11 PROCEDURE — 81003 URINALYSIS AUTO W/O SCOPE: CPT

## 2025-06-11 PROCEDURE — 80307 DRUG TEST PRSMV CHEM ANLYZR: CPT

## 2025-06-11 PROCEDURE — 85610 PROTHROMBIN TIME: CPT

## 2025-06-11 PROCEDURE — 87641 MR-STAPH DNA AMP PROBE: CPT

## 2025-06-11 ASSESSMENT — PAIN DESCRIPTION - ORIENTATION: ORIENTATION: RIGHT;LEFT;POSTERIOR

## 2025-06-11 ASSESSMENT — PAIN SCALES - GENERAL: PAINLEVEL_OUTOF10: 5

## 2025-06-11 NOTE — PERIOP NOTE
Patient verified name and     Order for consent NOT found in EHR at time of PAT visit. Unable to verify case posting against order; surgery verified by patient.    Case posting states CERVICAL DISCECTOMY FUSION ANTERIOR TWO LEVELS. Patient consent not available at this time. Case message sent. Chart flagged for CN review.        Type 2 surgery, walk-in assessment complete.    Labs per surgeon: CBC w/diff, BMP, UA, PT/INR, MRSA collected and resulted in Epic. Urine Nicotine pending, chart flagged for CN review.   Labs per anesthesia protocol: no additional  EKG: EKG collected 2025 and resulted in Epic        Patient provided with and instructed on educational handouts including Guide to Surgery, Preventing Surgical Site Infections, Pain Management, and [place] [facility] Brochure.    Patient answered medical/surgical history questions at their best of ability. All prior to admission medications documented in EPIC. Original medication prescription bottle was not visualized during patient appointment.     Patient instructed to hold all vitamins 7 days prior to surgery and NSAIDS 5 days prior to surgery, patient verbalized understanding.     Patient teach back successful and patient demonstrates knowledge of instructions.     PLEASE CONTINUE TAKING ALL PRESCRIPTION MEDICATIONS UP TO THE DAY OF SURGERY UNLESS OTHERWISE DIRECTED BELOW. You may take Tylenol, allergy,  and/or indigestion medications.     TAKE ONLY THESE MEDICATIONS ON THE DAY OF SURGERY   Albuterol inhaler - use and bring  Atorvastatin (Lipitor)  Tamsulosin (Flomax)     BREZTRI inhaler  Metoprolol (Lopressor)  Hydrocodone-acetaminophen ([place]) - if needed     Montelukast (Singulair)  Allopurinol (Zyloprim)       DISCONTINUE all vitamins and supplements now. DISCONTINUE Non-Steroidal Anti-Inflammatory (NSAIDS) such as Advil and Aleve 5 days prior to surgery.     Home Medications to Hold- please continue all other medications except these.

## 2025-06-12 LAB
COMMENT:: NORMAL
COTININE UR QL SCN: NEGATIVE NG/ML

## 2025-06-16 ENCOUNTER — ANESTHESIA EVENT (OUTPATIENT)
Dept: SURGERY | Age: 60
DRG: 473 | End: 2025-06-16
Payer: MEDICARE

## 2025-06-16 RX ORDER — SODIUM CHLORIDE 9 MG/ML
INJECTION, SOLUTION INTRAVENOUS PRN
Status: CANCELLED | OUTPATIENT
Start: 2025-06-16

## 2025-06-16 NOTE — PROGRESS NOTES
Preop to review case posting update from surgeon's office - no response from surgeon's office at this time.

## 2025-06-17 ENCOUNTER — ANESTHESIA (OUTPATIENT)
Dept: SURGERY | Age: 60
DRG: 473 | End: 2025-06-17
Payer: MEDICARE

## 2025-06-17 ENCOUNTER — HOSPITAL ENCOUNTER (INPATIENT)
Age: 60
LOS: 1 days | Discharge: HOME | DRG: 473 | End: 2025-06-18
Attending: NEUROLOGICAL SURGERY | Admitting: NEUROLOGICAL SURGERY
Payer: MEDICARE

## 2025-06-17 ENCOUNTER — APPOINTMENT (OUTPATIENT)
Dept: GENERAL RADIOLOGY | Age: 60
DRG: 473 | End: 2025-06-17
Attending: NEUROLOGICAL SURGERY
Payer: MEDICARE

## 2025-06-17 LAB
ABO + RH BLD: NORMAL
BLOOD GROUP ANTIBODIES SERPL: NORMAL
SPECIMEN EXP DATE BLD: NORMAL

## 2025-06-17 PROCEDURE — 86900 BLOOD TYPING SEROLOGIC ABO: CPT

## 2025-06-17 PROCEDURE — 3E0U0GB INTRODUCTION OF RECOMBINANT BONE MORPHOGENETIC PROTEIN INTO JOINTS, OPEN APPROACH: ICD-10-PCS | Performed by: NEUROLOGICAL SURGERY

## 2025-06-17 PROCEDURE — 6360000002 HC RX W HCPCS: Performed by: NEUROLOGICAL SURGERY

## 2025-06-17 PROCEDURE — 22551 ARTHRD ANT NTRBDY CERVICAL: CPT | Performed by: NEUROLOGICAL SURGERY

## 2025-06-17 PROCEDURE — 2720000010 HC SURG SUPPLY STERILE: Performed by: NEUROLOGICAL SURGERY

## 2025-06-17 PROCEDURE — 2580000003 HC RX 258: Performed by: NEUROLOGICAL SURGERY

## 2025-06-17 PROCEDURE — 3600000004 HC SURGERY LEVEL 4 BASE: Performed by: NEUROLOGICAL SURGERY

## 2025-06-17 PROCEDURE — G0378 HOSPITAL OBSERVATION PER HR: HCPCS

## 2025-06-17 PROCEDURE — 2500000003 HC RX 250 WO HCPCS: Performed by: NEUROLOGICAL SURGERY

## 2025-06-17 PROCEDURE — 3700000000 HC ANESTHESIA ATTENDED CARE: Performed by: NEUROLOGICAL SURGERY

## 2025-06-17 PROCEDURE — 6360000002 HC RX W HCPCS: Performed by: STUDENT IN AN ORGANIZED HEALTH CARE EDUCATION/TRAINING PROGRAM

## 2025-06-17 PROCEDURE — 2580000003 HC RX 258: Performed by: ANESTHESIOLOGY

## 2025-06-17 PROCEDURE — 86901 BLOOD TYPING SEROLOGIC RH(D): CPT

## 2025-06-17 PROCEDURE — 22552 ARTHRD ANT NTRBD CERVICAL EA: CPT | Performed by: NEUROLOGICAL SURGERY

## 2025-06-17 PROCEDURE — 72040 X-RAY EXAM NECK SPINE 2-3 VW: CPT

## 2025-06-17 PROCEDURE — 22853 INSJ BIOMECHANICAL DEVICE: CPT | Performed by: NEUROLOGICAL SURGERY

## 2025-06-17 PROCEDURE — 2709999900 HC NON-CHARGEABLE SUPPLY: Performed by: NEUROLOGICAL SURGERY

## 2025-06-17 PROCEDURE — 6370000000 HC RX 637 (ALT 250 FOR IP): Performed by: ANESTHESIOLOGY

## 2025-06-17 PROCEDURE — 0RG20A0 FUSION OF 2 OR MORE CERVICAL VERTEBRAL JOINTS WITH INTERBODY FUSION DEVICE, ANTERIOR APPROACH, ANTERIOR COLUMN, OPEN APPROACH: ICD-10-PCS | Performed by: NEUROLOGICAL SURGERY

## 2025-06-17 PROCEDURE — 6370000000 HC RX 637 (ALT 250 FOR IP): Performed by: NEUROLOGICAL SURGERY

## 2025-06-17 PROCEDURE — 2500000003 HC RX 250 WO HCPCS: Performed by: STUDENT IN AN ORGANIZED HEALTH CARE EDUCATION/TRAINING PROGRAM

## 2025-06-17 PROCEDURE — 1100000000 HC RM PRIVATE

## 2025-06-17 PROCEDURE — 3700000001 HC ADD 15 MINUTES (ANESTHESIA): Performed by: NEUROLOGICAL SURGERY

## 2025-06-17 PROCEDURE — 22845 INSERT SPINE FIXATION DEVICE: CPT | Performed by: NEUROLOGICAL SURGERY

## 2025-06-17 PROCEDURE — 0RT30ZZ RESECTION OF CERVICAL VERTEBRAL DISC, OPEN APPROACH: ICD-10-PCS | Performed by: NEUROLOGICAL SURGERY

## 2025-06-17 PROCEDURE — 3600000014 HC SURGERY LEVEL 4 ADDTL 15MIN: Performed by: NEUROLOGICAL SURGERY

## 2025-06-17 PROCEDURE — 00NW0ZZ RELEASE CERVICAL SPINAL CORD, OPEN APPROACH: ICD-10-PCS | Performed by: NEUROLOGICAL SURGERY

## 2025-06-17 PROCEDURE — 2500000003 HC RX 250 WO HCPCS: Performed by: ANESTHESIOLOGY

## 2025-06-17 PROCEDURE — 6360000002 HC RX W HCPCS: Performed by: ANESTHESIOLOGY

## 2025-06-17 PROCEDURE — C1713 ANCHOR/SCREW BN/BN,TIS/BN: HCPCS | Performed by: NEUROLOGICAL SURGERY

## 2025-06-17 PROCEDURE — 7100000000 HC PACU RECOVERY - FIRST 15 MIN: Performed by: NEUROLOGICAL SURGERY

## 2025-06-17 PROCEDURE — 86850 RBC ANTIBODY SCREEN: CPT

## 2025-06-17 PROCEDURE — 7100000001 HC PACU RECOVERY - ADDTL 15 MIN: Performed by: NEUROLOGICAL SURGERY

## 2025-06-17 DEVICE — GRAFT BNE SM: Type: IMPLANTABLE DEVICE | Site: ANTERIOR CERVICAL | Status: FUNCTIONAL

## 2025-06-17 DEVICE — IMPLANTABLE DEVICE: Type: IMPLANTABLE DEVICE | Site: ANTERIOR CERVICAL | Status: FUNCTIONAL

## 2025-06-17 DEVICE — ANTERIOR CERVICAL CAGE
Type: IMPLANTABLE DEVICE | Site: ANTERIOR CERVICAL | Status: FUNCTIONAL
Brand: TRITANIUM C

## 2025-06-17 DEVICE — SCREW SPNL SELF-STARTING 4X16 MM VA NS OZARK: Type: IMPLANTABLE DEVICE | Site: ANTERIOR CERVICAL | Status: FUNCTIONAL

## 2025-06-17 DEVICE — BIO DBM PUTTY
Type: IMPLANTABLE DEVICE | Site: ANTERIOR CERVICAL | Status: FUNCTIONAL
Brand: BIO DBM

## 2025-06-17 RX ORDER — FENTANYL CITRATE 50 UG/ML
INJECTION, SOLUTION INTRAMUSCULAR; INTRAVENOUS
Status: DISCONTINUED | OUTPATIENT
Start: 2025-06-17 | End: 2025-06-17 | Stop reason: SDUPTHER

## 2025-06-17 RX ORDER — SPIRONOLACTONE 25 MG/1
25 TABLET ORAL EVERY MORNING
Status: DISCONTINUED | OUTPATIENT
Start: 2025-06-18 | End: 2025-06-18 | Stop reason: HOSPADM

## 2025-06-17 RX ORDER — LOSARTAN POTASSIUM 50 MG/1
100 TABLET ORAL EVERY MORNING
Status: DISCONTINUED | OUTPATIENT
Start: 2025-06-18 | End: 2025-06-18 | Stop reason: HOSPADM

## 2025-06-17 RX ORDER — METHOCARBAMOL 500 MG/1
750 TABLET, FILM COATED ORAL EVERY 8 HOURS PRN
Status: DISCONTINUED | OUTPATIENT
Start: 2025-06-17 | End: 2025-06-18 | Stop reason: HOSPADM

## 2025-06-17 RX ORDER — GLYCOPYRROLATE 0.2 MG/ML
INJECTION INTRAMUSCULAR; INTRAVENOUS
Status: DISCONTINUED | OUTPATIENT
Start: 2025-06-17 | End: 2025-06-17 | Stop reason: SDUPTHER

## 2025-06-17 RX ORDER — ONDANSETRON 2 MG/ML
4 INJECTION INTRAMUSCULAR; INTRAVENOUS
Status: DISCONTINUED | OUTPATIENT
Start: 2025-06-17 | End: 2025-06-17 | Stop reason: HOSPADM

## 2025-06-17 RX ORDER — ESMOLOL HYDROCHLORIDE 10 MG/ML
INJECTION INTRAVENOUS
Status: DISCONTINUED | OUTPATIENT
Start: 2025-06-17 | End: 2025-06-17 | Stop reason: SDUPTHER

## 2025-06-17 RX ORDER — LANOLIN ALCOHOL/MO/W.PET/CERES
400 CREAM (GRAM) TOPICAL 2 TIMES DAILY
Status: DISCONTINUED | OUTPATIENT
Start: 2025-06-17 | End: 2025-06-18 | Stop reason: HOSPADM

## 2025-06-17 RX ORDER — ACETAMINOPHEN 500 MG
1000 TABLET ORAL EVERY 6 HOURS SCHEDULED
Status: DISCONTINUED | OUTPATIENT
Start: 2025-06-17 | End: 2025-06-18 | Stop reason: HOSPADM

## 2025-06-17 RX ORDER — EPHEDRINE SULFATE 5 MG/ML
INJECTION INTRAVENOUS
Status: DISCONTINUED | OUTPATIENT
Start: 2025-06-17 | End: 2025-06-17 | Stop reason: SDUPTHER

## 2025-06-17 RX ORDER — SODIUM CHLORIDE 9 MG/ML
INJECTION, SOLUTION INTRAVENOUS PRN
Status: DISCONTINUED | OUTPATIENT
Start: 2025-06-17 | End: 2025-06-17 | Stop reason: HOSPADM

## 2025-06-17 RX ORDER — PROPOFOL 10 MG/ML
INJECTION, EMULSION INTRAVENOUS
Status: DISCONTINUED | OUTPATIENT
Start: 2025-06-17 | End: 2025-06-17 | Stop reason: SDUPTHER

## 2025-06-17 RX ORDER — KETAMINE HCL IN NACL, ISO-OSM 20 MG/2 ML
SYRINGE (ML) INJECTION
Status: DISCONTINUED | OUTPATIENT
Start: 2025-06-17 | End: 2025-06-17 | Stop reason: SDUPTHER

## 2025-06-17 RX ORDER — METHOCARBAMOL 100 MG/ML
500 INJECTION, SOLUTION INTRAMUSCULAR; INTRAVENOUS ONCE AS NEEDED
Status: COMPLETED | OUTPATIENT
Start: 2025-06-17 | End: 2025-06-17

## 2025-06-17 RX ORDER — LIDOCAINE HYDROCHLORIDE 20 MG/ML
INJECTION, SOLUTION EPIDURAL; INFILTRATION; INTRACAUDAL; PERINEURAL
Status: DISCONTINUED | OUTPATIENT
Start: 2025-06-17 | End: 2025-06-17 | Stop reason: SDUPTHER

## 2025-06-17 RX ORDER — OXYCODONE HYDROCHLORIDE 5 MG/1
5 TABLET ORAL
Status: COMPLETED | OUTPATIENT
Start: 2025-06-17 | End: 2025-06-17

## 2025-06-17 RX ORDER — BISACODYL 10 MG
10 SUPPOSITORY, RECTAL RECTAL DAILY PRN
Status: DISCONTINUED | OUTPATIENT
Start: 2025-06-17 | End: 2025-06-18 | Stop reason: HOSPADM

## 2025-06-17 RX ORDER — CYCLOBENZAPRINE HCL 10 MG
10 TABLET ORAL 3 TIMES DAILY PRN
Status: DISCONTINUED | OUTPATIENT
Start: 2025-06-17 | End: 2025-06-18 | Stop reason: HOSPADM

## 2025-06-17 RX ORDER — NALOXONE HYDROCHLORIDE 0.4 MG/ML
INJECTION, SOLUTION INTRAMUSCULAR; INTRAVENOUS; SUBCUTANEOUS PRN
Status: DISCONTINUED | OUTPATIENT
Start: 2025-06-17 | End: 2025-06-17 | Stop reason: HOSPADM

## 2025-06-17 RX ORDER — BISACODYL 5 MG/1
5 TABLET, DELAYED RELEASE ORAL DAILY
Status: DISCONTINUED | OUTPATIENT
Start: 2025-06-18 | End: 2025-06-18 | Stop reason: HOSPADM

## 2025-06-17 RX ORDER — MIDAZOLAM HYDROCHLORIDE 2 MG/2ML
2 INJECTION, SOLUTION INTRAMUSCULAR; INTRAVENOUS
Status: DISCONTINUED | OUTPATIENT
Start: 2025-06-17 | End: 2025-06-17 | Stop reason: HOSPADM

## 2025-06-17 RX ORDER — SUCCINYLCHOLINE CHLORIDE 20 MG/ML
INJECTION INTRAMUSCULAR; INTRAVENOUS
Status: DISCONTINUED | OUTPATIENT
Start: 2025-06-17 | End: 2025-06-17 | Stop reason: SDUPTHER

## 2025-06-17 RX ORDER — LIDOCAINE HYDROCHLORIDE 10 MG/ML
1 INJECTION, SOLUTION INFILTRATION; PERINEURAL
Status: DISCONTINUED | OUTPATIENT
Start: 2025-06-17 | End: 2025-06-17 | Stop reason: HOSPADM

## 2025-06-17 RX ORDER — DEXMEDETOMIDINE HYDROCHLORIDE 100 UG/ML
INJECTION, SOLUTION INTRAVENOUS
Status: DISCONTINUED | OUTPATIENT
Start: 2025-06-17 | End: 2025-06-17 | Stop reason: SDUPTHER

## 2025-06-17 RX ORDER — DIPHENHYDRAMINE HYDROCHLORIDE 50 MG/ML
25 INJECTION, SOLUTION INTRAMUSCULAR; INTRAVENOUS EVERY 6 HOURS PRN
Status: DISCONTINUED | OUTPATIENT
Start: 2025-06-17 | End: 2025-06-18 | Stop reason: HOSPADM

## 2025-06-17 RX ORDER — HYDROMORPHONE HYDROCHLORIDE 1 MG/ML
0.25 INJECTION, SOLUTION INTRAMUSCULAR; INTRAVENOUS; SUBCUTANEOUS
Status: DISCONTINUED | OUTPATIENT
Start: 2025-06-17 | End: 2025-06-18 | Stop reason: HOSPADM

## 2025-06-17 RX ORDER — SODIUM CHLORIDE 0.9 % (FLUSH) 0.9 %
5-40 SYRINGE (ML) INJECTION PRN
Status: DISCONTINUED | OUTPATIENT
Start: 2025-06-17 | End: 2025-06-17 | Stop reason: HOSPADM

## 2025-06-17 RX ORDER — ONDANSETRON 4 MG/1
4 TABLET, ORALLY DISINTEGRATING ORAL EVERY 8 HOURS PRN
Status: DISCONTINUED | OUTPATIENT
Start: 2025-06-17 | End: 2025-06-18 | Stop reason: HOSPADM

## 2025-06-17 RX ORDER — METOPROLOL TARTRATE 50 MG
50 TABLET ORAL 2 TIMES DAILY
Status: DISCONTINUED | OUTPATIENT
Start: 2025-06-17 | End: 2025-06-18 | Stop reason: HOSPADM

## 2025-06-17 RX ORDER — SODIUM CHLORIDE, SODIUM LACTATE, POTASSIUM CHLORIDE, CALCIUM CHLORIDE 600; 310; 30; 20 MG/100ML; MG/100ML; MG/100ML; MG/100ML
INJECTION, SOLUTION INTRAVENOUS CONTINUOUS
Status: DISCONTINUED | OUTPATIENT
Start: 2025-06-17 | End: 2025-06-18 | Stop reason: HOSPADM

## 2025-06-17 RX ORDER — LABETALOL HYDROCHLORIDE 5 MG/ML
INJECTION, SOLUTION INTRAVENOUS
Status: DISCONTINUED | OUTPATIENT
Start: 2025-06-17 | End: 2025-06-17 | Stop reason: SDUPTHER

## 2025-06-17 RX ORDER — NEOSTIGMINE METHYLSULFATE 1 MG/ML
INJECTION INTRAVENOUS
Status: DISCONTINUED | OUTPATIENT
Start: 2025-06-17 | End: 2025-06-17 | Stop reason: SDUPTHER

## 2025-06-17 RX ORDER — SODIUM CHLORIDE 0.9 % (FLUSH) 0.9 %
5-40 SYRINGE (ML) INJECTION PRN
Status: DISCONTINUED | OUTPATIENT
Start: 2025-06-17 | End: 2025-06-18 | Stop reason: HOSPADM

## 2025-06-17 RX ORDER — ACETAMINOPHEN 500 MG
1000 TABLET ORAL ONCE
Status: DISCONTINUED | OUTPATIENT
Start: 2025-06-17 | End: 2025-06-17 | Stop reason: HOSPADM

## 2025-06-17 RX ORDER — ROCURONIUM BROMIDE 10 MG/ML
INJECTION, SOLUTION INTRAVENOUS
Status: DISCONTINUED | OUTPATIENT
Start: 2025-06-17 | End: 2025-06-17 | Stop reason: SDUPTHER

## 2025-06-17 RX ORDER — KETAMINE HCL IN NACL, ISO-OSM 20 MG/2 ML
10 SYRINGE (ML) INJECTION EVERY 5 MIN PRN
Status: COMPLETED | OUTPATIENT
Start: 2025-06-17 | End: 2025-06-17

## 2025-06-17 RX ORDER — BUPIVACAINE HYDROCHLORIDE AND EPINEPHRINE 5; 5 MG/ML; UG/ML
INJECTION, SOLUTION EPIDURAL; INTRACAUDAL; PERINEURAL PRN
Status: DISCONTINUED | OUTPATIENT
Start: 2025-06-17 | End: 2025-06-17 | Stop reason: ALTCHOICE

## 2025-06-17 RX ORDER — VECURONIUM BROMIDE 1 MG/ML
INJECTION, POWDER, LYOPHILIZED, FOR SOLUTION INTRAVENOUS
Status: DISCONTINUED | OUTPATIENT
Start: 2025-06-17 | End: 2025-06-17 | Stop reason: SDUPTHER

## 2025-06-17 RX ORDER — TAMSULOSIN HYDROCHLORIDE 0.4 MG/1
0.4 CAPSULE ORAL EVERY MORNING
Status: DISCONTINUED | OUTPATIENT
Start: 2025-06-18 | End: 2025-06-18 | Stop reason: HOSPADM

## 2025-06-17 RX ORDER — SODIUM CHLORIDE, SODIUM LACTATE, POTASSIUM CHLORIDE, CALCIUM CHLORIDE 600; 310; 30; 20 MG/100ML; MG/100ML; MG/100ML; MG/100ML
INJECTION, SOLUTION INTRAVENOUS CONTINUOUS
Status: DISCONTINUED | OUTPATIENT
Start: 2025-06-17 | End: 2025-06-17 | Stop reason: HOSPADM

## 2025-06-17 RX ORDER — DIPHENHYDRAMINE HYDROCHLORIDE 50 MG/ML
12.5 INJECTION, SOLUTION INTRAMUSCULAR; INTRAVENOUS
Status: DISCONTINUED | OUTPATIENT
Start: 2025-06-17 | End: 2025-06-17 | Stop reason: HOSPADM

## 2025-06-17 RX ORDER — SODIUM CHLORIDE 0.9 % (FLUSH) 0.9 %
5-40 SYRINGE (ML) INJECTION EVERY 12 HOURS SCHEDULED
Status: DISCONTINUED | OUTPATIENT
Start: 2025-06-17 | End: 2025-06-17 | Stop reason: HOSPADM

## 2025-06-17 RX ORDER — DIPHENHYDRAMINE HCL 25 MG
25 CAPSULE ORAL EVERY 6 HOURS PRN
Status: DISCONTINUED | OUTPATIENT
Start: 2025-06-17 | End: 2025-06-18 | Stop reason: HOSPADM

## 2025-06-17 RX ORDER — ALBUTEROL SULFATE 0.83 MG/ML
2.5 SOLUTION RESPIRATORY (INHALATION) EVERY 4 HOURS PRN
Status: DISCONTINUED | OUTPATIENT
Start: 2025-06-17 | End: 2025-06-18 | Stop reason: HOSPADM

## 2025-06-17 RX ORDER — SODIUM CHLORIDE 9 MG/ML
INJECTION, SOLUTION INTRAVENOUS PRN
Status: DISCONTINUED | OUTPATIENT
Start: 2025-06-17 | End: 2025-06-18 | Stop reason: HOSPADM

## 2025-06-17 RX ORDER — DEXAMETHASONE SODIUM PHOSPHATE 10 MG/ML
INJECTION, SOLUTION INTRA-ARTICULAR; INTRALESIONAL; INTRAMUSCULAR; INTRAVENOUS; SOFT TISSUE
Status: DISCONTINUED | OUTPATIENT
Start: 2025-06-17 | End: 2025-06-17 | Stop reason: SDUPTHER

## 2025-06-17 RX ORDER — ONDANSETRON 2 MG/ML
INJECTION INTRAMUSCULAR; INTRAVENOUS
Status: DISCONTINUED | OUTPATIENT
Start: 2025-06-17 | End: 2025-06-17 | Stop reason: SDUPTHER

## 2025-06-17 RX ORDER — ATORVASTATIN CALCIUM 10 MG/1
20 TABLET, FILM COATED ORAL EVERY MORNING
Status: DISCONTINUED | OUTPATIENT
Start: 2025-06-18 | End: 2025-06-18 | Stop reason: HOSPADM

## 2025-06-17 RX ORDER — CLONIDINE HYDROCHLORIDE 0.2 MG/1
0.1 TABLET ORAL NIGHTLY
Status: DISCONTINUED | OUTPATIENT
Start: 2025-06-17 | End: 2025-06-18 | Stop reason: HOSPADM

## 2025-06-17 RX ORDER — IBUPROFEN 600 MG/1
1 TABLET ORAL PRN
Status: DISCONTINUED | OUTPATIENT
Start: 2025-06-17 | End: 2025-06-17 | Stop reason: HOSPADM

## 2025-06-17 RX ORDER — OXYCODONE HYDROCHLORIDE 5 MG/1
5 TABLET ORAL EVERY 4 HOURS PRN
Status: DISCONTINUED | OUTPATIENT
Start: 2025-06-17 | End: 2025-06-18 | Stop reason: HOSPADM

## 2025-06-17 RX ORDER — CLINDAMYCIN PHOSPHATE 900 MG/50ML
900 INJECTION, SOLUTION INTRAVENOUS ONCE
Status: COMPLETED | OUTPATIENT
Start: 2025-06-17 | End: 2025-06-17

## 2025-06-17 RX ORDER — HYDROMORPHONE HYDROCHLORIDE 1 MG/ML
0.5 INJECTION, SOLUTION INTRAMUSCULAR; INTRAVENOUS; SUBCUTANEOUS
Status: DISCONTINUED | OUTPATIENT
Start: 2025-06-17 | End: 2025-06-18 | Stop reason: HOSPADM

## 2025-06-17 RX ORDER — ONDANSETRON 2 MG/ML
4 INJECTION INTRAMUSCULAR; INTRAVENOUS EVERY 6 HOURS PRN
Status: DISCONTINUED | OUTPATIENT
Start: 2025-06-17 | End: 2025-06-18 | Stop reason: HOSPADM

## 2025-06-17 RX ORDER — ETOMIDATE 2 MG/ML
INJECTION INTRAVENOUS
Status: DISCONTINUED | OUTPATIENT
Start: 2025-06-17 | End: 2025-06-17 | Stop reason: SDUPTHER

## 2025-06-17 RX ORDER — FAMOTIDINE 20 MG/1
20 TABLET, FILM COATED ORAL 2 TIMES DAILY
Status: DISCONTINUED | OUTPATIENT
Start: 2025-06-17 | End: 2025-06-18 | Stop reason: HOSPADM

## 2025-06-17 RX ORDER — NICARDIPINE HYDROCHLORIDE 0.1 MG/ML
INJECTION INTRAVENOUS
Status: DISCONTINUED | OUTPATIENT
Start: 2025-06-17 | End: 2025-06-17 | Stop reason: SDUPTHER

## 2025-06-17 RX ORDER — DEXTROSE MONOHYDRATE 100 MG/ML
INJECTION, SOLUTION INTRAVENOUS CONTINUOUS PRN
Status: DISCONTINUED | OUTPATIENT
Start: 2025-06-17 | End: 2025-06-17 | Stop reason: HOSPADM

## 2025-06-17 RX ORDER — SODIUM CHLORIDE 0.9 % (FLUSH) 0.9 %
5-40 SYRINGE (ML) INJECTION EVERY 12 HOURS SCHEDULED
Status: DISCONTINUED | OUTPATIENT
Start: 2025-06-17 | End: 2025-06-18 | Stop reason: HOSPADM

## 2025-06-17 RX ORDER — METHOCARBAMOL 100 MG/ML
1000 INJECTION, SOLUTION INTRAMUSCULAR; INTRAVENOUS EVERY 8 HOURS PRN
Status: DISCONTINUED | OUTPATIENT
Start: 2025-06-17 | End: 2025-06-18 | Stop reason: HOSPADM

## 2025-06-17 RX ORDER — OXYCODONE HYDROCHLORIDE 5 MG/1
10 TABLET ORAL EVERY 4 HOURS PRN
Status: DISCONTINUED | OUTPATIENT
Start: 2025-06-17 | End: 2025-06-18 | Stop reason: HOSPADM

## 2025-06-17 RX ORDER — VANCOMYCIN HYDROCHLORIDE 1 G/20ML
INJECTION, POWDER, LYOPHILIZED, FOR SOLUTION INTRAVENOUS PRN
Status: DISCONTINUED | OUTPATIENT
Start: 2025-06-17 | End: 2025-06-17 | Stop reason: ALTCHOICE

## 2025-06-17 RX ORDER — LIDOCAINE HYDROCHLORIDE ANHYDROUS AND DEXTROSE MONOHYDRATE 5; 400 G/100ML; MG/100ML
1 INJECTION, SOLUTION INTRAVENOUS CONTINUOUS
Status: DISCONTINUED | OUTPATIENT
Start: 2025-06-17 | End: 2025-06-18 | Stop reason: HOSPADM

## 2025-06-17 RX ORDER — POLYETHYLENE GLYCOL 3350 17 G/17G
17 POWDER, FOR SOLUTION ORAL DAILY
Status: DISCONTINUED | OUTPATIENT
Start: 2025-06-18 | End: 2025-06-18 | Stop reason: HOSPADM

## 2025-06-17 RX ORDER — SENNA AND DOCUSATE SODIUM 50; 8.6 MG/1; MG/1
1 TABLET, FILM COATED ORAL 2 TIMES DAILY
Status: DISCONTINUED | OUTPATIENT
Start: 2025-06-17 | End: 2025-06-18 | Stop reason: HOSPADM

## 2025-06-17 RX ORDER — PROCHLORPERAZINE EDISYLATE 5 MG/ML
5 INJECTION INTRAMUSCULAR; INTRAVENOUS
Status: DISCONTINUED | OUTPATIENT
Start: 2025-06-17 | End: 2025-06-17 | Stop reason: HOSPADM

## 2025-06-17 RX ADMIN — ACETAMINOPHEN 1000 MG: 500 TABLET, FILM COATED ORAL at 16:28

## 2025-06-17 RX ADMIN — HYDROMORPHONE HYDROCHLORIDE 0.2 MG: 0.5 INJECTION, SOLUTION INTRAMUSCULAR; INTRAVENOUS; SUBCUTANEOUS at 11:48

## 2025-06-17 RX ADMIN — NICARDIPINE HYDROCHLORIDE 0.2 MG: 0.1 INJECTION INTRAVENOUS at 09:57

## 2025-06-17 RX ADMIN — HYDROMORPHONE HYDROCHLORIDE 0.3 MG: 0.5 INJECTION, SOLUTION INTRAMUSCULAR; INTRAVENOUS; SUBCUTANEOUS at 07:51

## 2025-06-17 RX ADMIN — Medication 10 MG: at 12:51

## 2025-06-17 RX ADMIN — EPHEDRINE SULFATE 10 MG: 5 INJECTION INTRAVENOUS at 08:19

## 2025-06-17 RX ADMIN — NICARDIPINE HYDROCHLORIDE 0.2 MG: 0.1 INJECTION INTRAVENOUS at 10:54

## 2025-06-17 RX ADMIN — VANCOMYCIN HYDROCHLORIDE 1500 MG: 10 INJECTION, POWDER, LYOPHILIZED, FOR SOLUTION INTRAVENOUS at 20:10

## 2025-06-17 RX ADMIN — HYDROMORPHONE HYDROCHLORIDE 0.4 MG: 0.5 INJECTION, SOLUTION INTRAMUSCULAR; INTRAVENOUS; SUBCUTANEOUS at 11:13

## 2025-06-17 RX ADMIN — NEOSTIGMINE METHYLSULFATE 5 MG: 1 INJECTION INTRAVENOUS at 11:22

## 2025-06-17 RX ADMIN — HYDROMORPHONE HYDROCHLORIDE 0.5 MG: 0.5 INJECTION, SOLUTION INTRAMUSCULAR; INTRAVENOUS; SUBCUTANEOUS at 07:43

## 2025-06-17 RX ADMIN — ESMOLOL HYDROCHLORIDE 10 MG: 10 INJECTION INTRAVENOUS at 11:21

## 2025-06-17 RX ADMIN — CLONIDINE HYDROCHLORIDE 0.1 MG: 0.2 TABLET ORAL at 19:54

## 2025-06-17 RX ADMIN — PROPOFOL 50 MG: 10 INJECTION, EMULSION INTRAVENOUS at 09:19

## 2025-06-17 RX ADMIN — NICARDIPINE HYDROCHLORIDE 0.2 MG: 0.1 INJECTION INTRAVENOUS at 10:45

## 2025-06-17 RX ADMIN — ROCURONIUM BROMIDE 50 MG: 10 INJECTION, SOLUTION INTRAVENOUS at 07:57

## 2025-06-17 RX ADMIN — VANCOMYCIN HYDROCHLORIDE 1000 MG: 1 INJECTION, POWDER, LYOPHILIZED, FOR SOLUTION INTRAVENOUS at 08:15

## 2025-06-17 RX ADMIN — Medication 10 MG: at 12:46

## 2025-06-17 RX ADMIN — OXYCODONE 5 MG: 5 TABLET ORAL at 13:19

## 2025-06-17 RX ADMIN — LABETALOL HYDROCHLORIDE 10 MG: 5 INJECTION, SOLUTION INTRAVENOUS at 11:26

## 2025-06-17 RX ADMIN — PROPOFOL 20 MG: 10 INJECTION, EMULSION INTRAVENOUS at 09:10

## 2025-06-17 RX ADMIN — HYDROMORPHONE HYDROCHLORIDE 0.5 MG: 1 INJECTION, SOLUTION INTRAMUSCULAR; INTRAVENOUS; SUBCUTANEOUS at 11:52

## 2025-06-17 RX ADMIN — CLINDAMYCIN PHOSPHATE 900 MG: 900 INJECTION, SOLUTION INTRAVENOUS at 08:02

## 2025-06-17 RX ADMIN — ONDANSETRON 4 MG: 2 INJECTION, SOLUTION INTRAMUSCULAR; INTRAVENOUS at 11:44

## 2025-06-17 RX ADMIN — SODIUM CHLORIDE, SODIUM LACTATE, POTASSIUM CHLORIDE, AND CALCIUM CHLORIDE: 600; 310; 30; 20 INJECTION, SOLUTION INTRAVENOUS at 07:32

## 2025-06-17 RX ADMIN — CEFAZOLIN 2000 MG: 10 INJECTION, POWDER, FOR SOLUTION INTRAVENOUS at 16:30

## 2025-06-17 RX ADMIN — OXYCODONE 10 MG: 5 TABLET ORAL at 19:55

## 2025-06-17 RX ADMIN — ROCURONIUM BROMIDE 20 MG: 10 INJECTION, SOLUTION INTRAVENOUS at 09:31

## 2025-06-17 RX ADMIN — VECURONIUM BROMIDE 1 MG: 1 INJECTION, POWDER, LYOPHILIZED, FOR SOLUTION INTRAVENOUS at 10:32

## 2025-06-17 RX ADMIN — HYDROMORPHONE HYDROCHLORIDE 0.4 MG: 0.5 INJECTION, SOLUTION INTRAMUSCULAR; INTRAVENOUS; SUBCUTANEOUS at 11:29

## 2025-06-17 RX ADMIN — Medication 3 AMPULE: at 07:32

## 2025-06-17 RX ADMIN — HYDROMORPHONE HYDROCHLORIDE 0.5 MG: 1 INJECTION, SOLUTION INTRAMUSCULAR; INTRAVENOUS; SUBCUTANEOUS at 12:18

## 2025-06-17 RX ADMIN — SODIUM CHLORIDE, POTASSIUM CHLORIDE, SODIUM LACTATE AND CALCIUM CHLORIDE: 600; 310; 30; 20 INJECTION, SOLUTION INTRAVENOUS at 16:46

## 2025-06-17 RX ADMIN — DEXAMETHASONE SODIUM PHOSPHATE 10 MG: 10 INJECTION INTRAMUSCULAR; INTRAVENOUS at 08:05

## 2025-06-17 RX ADMIN — PROPOFOL 80 MG: 10 INJECTION, EMULSION INTRAVENOUS at 08:52

## 2025-06-17 RX ADMIN — ROCURONIUM BROMIDE 20 MG: 10 INJECTION, SOLUTION INTRAVENOUS at 08:32

## 2025-06-17 RX ADMIN — Medication 200 MG: at 07:50

## 2025-06-17 RX ADMIN — VECURONIUM BROMIDE 2 MG: 1 INJECTION, POWDER, LYOPHILIZED, FOR SOLUTION INTRAVENOUS at 10:20

## 2025-06-17 RX ADMIN — CYCLOBENZAPRINE HYDROCHLORIDE 10 MG: 10 TABLET, FILM COATED ORAL at 19:55

## 2025-06-17 RX ADMIN — GLYCOPYRROLATE 0.8 MG: 0.2 INJECTION INTRAMUSCULAR; INTRAVENOUS at 11:22

## 2025-06-17 RX ADMIN — HYDROMORPHONE HYDROCHLORIDE 0.2 MG: 0.5 INJECTION, SOLUTION INTRAMUSCULAR; INTRAVENOUS; SUBCUTANEOUS at 08:39

## 2025-06-17 RX ADMIN — HYDROMORPHONE HYDROCHLORIDE 0.5 MG: 1 INJECTION, SOLUTION INTRAMUSCULAR; INTRAVENOUS; SUBCUTANEOUS at 16:29

## 2025-06-17 RX ADMIN — DOCUSATE SODIUM 50 MG AND SENNOSIDES 8.6 MG 1 TABLET: 8.6; 5 TABLET, FILM COATED ORAL at 19:55

## 2025-06-17 RX ADMIN — HYDROMORPHONE HYDROCHLORIDE 0.5 MG: 1 INJECTION, SOLUTION INTRAMUSCULAR; INTRAVENOUS; SUBCUTANEOUS at 21:34

## 2025-06-17 RX ADMIN — METHOCARBAMOL 500 MG: 100 INJECTION INTRAMUSCULAR; INTRAVENOUS at 14:14

## 2025-06-17 RX ADMIN — FENTANYL CITRATE 50 MCG: 50 INJECTION, SOLUTION INTRAMUSCULAR; INTRAVENOUS at 09:07

## 2025-06-17 RX ADMIN — FENTANYL CITRATE 50 MCG: 50 INJECTION, SOLUTION INTRAMUSCULAR; INTRAVENOUS at 08:56

## 2025-06-17 RX ADMIN — HYDROMORPHONE HYDROCHLORIDE 0.5 MG: 1 INJECTION, SOLUTION INTRAMUSCULAR; INTRAVENOUS; SUBCUTANEOUS at 12:06

## 2025-06-17 RX ADMIN — MAGNESIUM GLUCONATE 500 MG ORAL TABLET 400 MG: 500 TABLET ORAL at 19:55

## 2025-06-17 RX ADMIN — FAMOTIDINE 20 MG: 10 INJECTION, SOLUTION INTRAVENOUS at 19:55

## 2025-06-17 RX ADMIN — ESMOLOL HYDROCHLORIDE 10 MG: 10 INJECTION INTRAVENOUS at 10:54

## 2025-06-17 RX ADMIN — HYDROMORPHONE HYDROCHLORIDE 0.5 MG: 1 INJECTION, SOLUTION INTRAMUSCULAR; INTRAVENOUS; SUBCUTANEOUS at 12:01

## 2025-06-17 RX ADMIN — Medication 10 MG: at 08:50

## 2025-06-17 RX ADMIN — SODIUM CHLORIDE, SODIUM LACTATE, POTASSIUM CHLORIDE, AND CALCIUM CHLORIDE: 600; 310; 30; 20 INJECTION, SOLUTION INTRAVENOUS at 10:32

## 2025-06-17 RX ADMIN — NICARDIPINE HYDROCHLORIDE 0.2 MG: 0.1 INJECTION INTRAVENOUS at 11:21

## 2025-06-17 RX ADMIN — ESMOLOL HYDROCHLORIDE 20 MG: 10 INJECTION INTRAVENOUS at 11:16

## 2025-06-17 RX ADMIN — PROPOFOL 50 MG: 10 INJECTION, EMULSION INTRAVENOUS at 08:50

## 2025-06-17 RX ADMIN — LIDOCAINE HYDROCHLORIDE 100 MG: 20 INJECTION, SOLUTION EPIDURAL; INFILTRATION; INTRACAUDAL; PERINEURAL at 07:50

## 2025-06-17 RX ADMIN — ETOMIDATE 20 MG: 2 INJECTION, SOLUTION INTRAVENOUS at 07:50

## 2025-06-17 RX ADMIN — NICARDIPINE HYDROCHLORIDE 0.2 MG: 0.1 INJECTION INTRAVENOUS at 10:57

## 2025-06-17 RX ADMIN — PROPOFOL 50 MG: 10 INJECTION, EMULSION INTRAVENOUS at 11:29

## 2025-06-17 RX ADMIN — ESMOLOL HYDROCHLORIDE 30 MG: 10 INJECTION INTRAVENOUS at 09:19

## 2025-06-17 RX ADMIN — LIDOCAINE HYDROCHLORIDE 1 MG/KG/HR: 4 INJECTION, SOLUTION INTRAVENOUS at 14:42

## 2025-06-17 RX ADMIN — ROCURONIUM BROMIDE 10 MG: 10 INJECTION, SOLUTION INTRAVENOUS at 09:05

## 2025-06-17 RX ADMIN — EPHEDRINE SULFATE 10 MG: 5 INJECTION INTRAVENOUS at 08:45

## 2025-06-17 RX ADMIN — Medication 20 MG: at 07:50

## 2025-06-17 RX ADMIN — DEXMEDETOMIDINE 8 MCG: 100 INJECTION, SOLUTION, CONCENTRATE INTRAVENOUS at 09:55

## 2025-06-17 RX ADMIN — ESMOLOL HYDROCHLORIDE 20 MG: 10 INJECTION INTRAVENOUS at 10:57

## 2025-06-17 RX ADMIN — METOPROLOL TARTRATE 50 MG: 50 TABLET, FILM COATED ORAL at 19:55

## 2025-06-17 RX ADMIN — Medication 10 MG: at 09:45

## 2025-06-17 ASSESSMENT — PAIN DESCRIPTION - PAIN TYPE
TYPE: SURGICAL PAIN

## 2025-06-17 ASSESSMENT — PAIN DESCRIPTION - ORIENTATION
ORIENTATION: ANTERIOR
ORIENTATION: MID
ORIENTATION: ANTERIOR
ORIENTATION: RIGHT;LEFT;POSTERIOR

## 2025-06-17 ASSESSMENT — PAIN SCALES - GENERAL
PAINLEVEL_OUTOF10: 9
PAINLEVEL_OUTOF10: 3
PAINLEVEL_OUTOF10: 8
PAINLEVEL_OUTOF10: 2
PAINLEVEL_OUTOF10: 2
PAINLEVEL_OUTOF10: 7
PAINLEVEL_OUTOF10: 4
PAINLEVEL_OUTOF10: 8
PAINLEVEL_OUTOF10: 7
PAINLEVEL_OUTOF10: 3
PAINLEVEL_OUTOF10: 3
PAINLEVEL_OUTOF10: 8
PAINLEVEL_OUTOF10: 10
PAINLEVEL_OUTOF10: 7

## 2025-06-17 ASSESSMENT — PAIN DESCRIPTION - DESCRIPTORS
DESCRIPTORS: ACHING;HEAVINESS
DESCRIPTORS: ACHING;SORE;DISCOMFORT
DESCRIPTORS: ACHING
DESCRIPTORS: ACHING;SORE;DISCOMFORT
DESCRIPTORS: ACHING

## 2025-06-17 ASSESSMENT — PAIN - FUNCTIONAL ASSESSMENT
PAIN_FUNCTIONAL_ASSESSMENT: ACTIVITIES ARE NOT PREVENTED
PAIN_FUNCTIONAL_ASSESSMENT: 0-10

## 2025-06-17 ASSESSMENT — PAIN DESCRIPTION - FREQUENCY
FREQUENCY: INTERMITTENT
FREQUENCY: CONTINUOUS
FREQUENCY: INTERMITTENT
FREQUENCY: INTERMITTENT

## 2025-06-17 ASSESSMENT — PAIN DESCRIPTION - LOCATION
LOCATION: INCISION;NECK
LOCATION: NECK;INCISION
LOCATION: NECK
LOCATION: NECK
LOCATION: NECK;INCISION
LOCATION: NECK

## 2025-06-17 ASSESSMENT — PAIN DESCRIPTION - ONSET
ONSET: GRADUAL
ONSET: ON-GOING
ONSET: GRADUAL
ONSET: GRADUAL

## 2025-06-17 NOTE — ANESTHESIA PRE PROCEDURE
Department of Anesthesiology  Preprocedure Note       Name:  Casey Pittman   Age:  59 y.o.  :  1965                                          MRN:  [id number]         Date:  2025      Surgeon: Surgeon(s):  Navjot White MD    Procedure: Procedure(s):  CERVICAL DISCECTOMY FUSION ANTERIOR TWO LEVELS    Medications prior to admission:   Prior to Admission medications    Medication Sig Start Date End Date Taking? Authorizing Provider   Budeson-Glycopyrrol-Formoterol (BREZTRI AEROSPHERE) 160-9-4.8 MCG/ACT AERO Inhale 2 puffs into the lungs in the morning and at bedtime   Yes Pete Rosas MD   HYDROcodone-acetaminophen (NORCO)  MG per tablet Take 1 tablet by mouth 4 times daily. 18  Yes Automatic Reconciliation, Ar   potassium chloride (KLOR-CON) 10 MEQ extended release tablet Take 1 tablet by mouth every morning 25   Pete Rosas MD   Albuterol-Budesonide 90-80 MCG/ACT AERO Inhale 2 puffs into the lungs every 4 hours as needed Airsupra 25   Pete Rosas MD   omeprazole (PRILOSEC) 40 MG delayed release capsule Take 1 capsule by mouth every evening    Pete Rosas MD   Magnesium 400 MG TABS Take 1 capsule by mouth in the morning and at bedtime    Pete Rosas MD   spironolactone (ALDACTONE) 25 MG tablet Take 1 tablet by mouth every morning    Pete Rosas MD   ferrous sulfate (IRON 325) 325 (65 Fe) MG tablet Take 1 tablet by mouth daily (with breakfast)  Patient not taking: Reported on 2025    Pete Rosas MD   tamsulosin (FLOMAX) 0.4 MG capsule Take 1 capsule by mouth every morning  Patient not taking: Reported on 2025    Pete Rosas MD   atorvastatin (LIPITOR) 20 MG tablet Take 1 tablet by mouth every morning    Pete Rosas MD   losartan (COZAAR) 100 MG tablet Take 1 tablet by mouth every morning    Pete Rosas MD   cloNIDine (CATAPRES) 0.1 MG tablet Take 1 tablet by mouth at

## 2025-06-17 NOTE — ANESTHESIA POSTPROCEDURE EVALUATION
Department of Anesthesiology  Postprocedure Note    Patient: Casey Pittman  MRN: [id number]  YOB: 1965  Date of evaluation: 6/17/2025    Procedure Summary       Date: 06/17/25 Room / Location: [facility] MAIN OR 07 / [facility] MAIN OR    Anesthesia Start: 0735 Anesthesia Stop: 1149    Procedure: CERVICAL DISCECTOMY FUSION ANTERIOR TWO LEVELS (Spine Cervical) Diagnosis:       Cervical spinal stenosis      Neck pain      Cervical radiculopathy      Weakness of both arms      Numbness and tingling of both upper extremities while sleeping      (Cervical spinal stenosis [M48.02])      (Neck pain [M54.2])      (Cervical radiculopathy [M54.12])      (Weakness of both arms [R29.898])      (Numbness and tingling of both upper extremities while sleeping [R20.0, R20.2])    Providers: Navjot White MD Responsible Provider: Zaira Harris MD    Anesthesia Type: General ASA Status: 4            Anesthesia Type: General    Macey Phase I: Macey Score: 9    Macey Phase II:      Anesthesia Post Evaluation    Patient location during evaluation: PACU  Patient participation: complete - patient participated  Level of consciousness: awake and alert  Airway patency: patent  Nausea: well controlled.  Cardiovascular status: acceptable.  Respiratory status: acceptable  Hydration status: stable  Comments: Prolonged PACU stay for pain control  Pain control: Improving.    No notable events documented.

## 2025-06-17 NOTE — ANESTHESIA PROCEDURE NOTES
Airway  Date/Time: 6/17/2025 7:53 AM  Urgency: elective    Airway not difficult    General Information and Staff    Patient location during procedure: OR  Resident/CRNA: Terwilliger, Chelsea, APRN - CRNA  Performed: resident/CRNA   Performed by: Terwilliger, Chelsea, APRN - CRNA  Authorized by: Zaira Harris MD      Indications and Patient Condition  Indications for airway management: anesthesia  Spontaneous Ventilation: absent  Sedation level: deep  Preoxygenated: yes  Patient position: sniffing  MILS not maintained throughout  Mask difficulty assessment: not attempted    Final Airway Details  Final airway type: endotracheal airway      Successful airway: ETT  Cuffed: yes   Successful intubation technique: video laryngoscopy  Facilitating devices/methods: intubating stylet  Endotracheal tube insertion site: oral  Blade: Xin (glidescope)  Blade size: #4  ETT size (mm): 8.0  Cormack-Lehane Classification: grade I - full view of glottis  Placement verified by: chest auscultation and capnometry   Measured from: lips  ETT to lips (cm): 23  Number of attempts at approach: 1  Ventilation between attempts: bag mask    Additional Comments  Atraumatic insertion, lips and teeth as preeval. Head/neck neutral during DL and intubation.

## 2025-06-17 NOTE — OP NOTE
NEUROSURGERY OPERATIVE REPORT:      Patient Name:Casey Pittman     Patient MRN:[id number]     Patient YOB: 1965     Date of Procedure:6/17/2025     Pre-Op Diagnosis Codes:      * Cervical spinal stenosis [M48.02]     * Neck pain [M54.2]     * Cervical radiculopathy [M54.12]     * Weakness of both arms [R29.898]     * Numbness and tingling of both upper extremities while sleeping [R20.0, R20.2]    Post-Procedure Diagnosis: SAME AS ABOVE      Procedure:   1  C3-C4 and C4-C5  COMPLETE ANTERIOR CERVICAL DISKECTOMY, OSTEOPHYTECTOMY FOR NERVE ROOT AND SPINAL CORD DECOMPRESSION  2. C3-C4 and C4-C5 INTERBODY FIXATION WITH GANGA TRITANIUM LORDOTIC DEVICE/GRAFTS, AUTOGRAFT, DBM AND   3. ANTERIOR PLATE AND SCREW FIXATION FROM C3,C4,C5   4. INTRAOPERATIVE MICROSCOPE (MICROSURGICAL TECHNIQUE)      Anesthesia: General Endotracheal anesthesia      Blood Loss: 25 cc     Surgeon: Navjot White MD     Anesthesiologist: Zaira Harris MD     Surgical Assistant: Monique Tinajero Surgical Assistant First Assist     Surgical Staff:   See Operative Record      Indication for Procedure:   Casey Pittman 59 y.o. male who presented to clinic on 1/6/2025 for evaluation and follow-up regarding neck pain, bilateral lower extremity pain, bilateral extremity extremity numbness and tingling and chronic low back pain. I have independently reviewed and interpreted the patient's MRI cervical spine without contrast performed on 12/20/2024 when the cervical spine that demonstrates a history of a prior C5-C6 anterior cervical discectomy and fusion construct with proper positioning of the anterior interbody device. At C3-C4 there is a disc osteophyte complex that effaces the ventral CSF signal and ultimately results in concentric spinal stenosis with the AP diameter of the canal being 8.3 mm at C3-C4. There is moderate central and moderate neuroforaminal stenosis at this level. At C4-C5 there is mild spinal stenosis.

## 2025-06-17 NOTE — H&P
NEUROSURGERY H&P NOTE:     CC: Day of surgery, neck pain, bilateral lower extremity pain, bilateral extremity numbness and tingling chronic low back pain    HPI:   Casey Pittman 59 y.o. male who presented to clinic on 1/6/2025 for evaluation and follow-up regarding neck pain, bilateral lower extremity pain, bilateral extremity extremity numbness and tingling and chronic low back pain.  We have seen the patient before on previous visits.  The patient has had left greater than right upper extremity pain and numbness and tingling that has been progressing and worsening since February of this year.  Initially affected his left upper extremity and left hand but now feels that his  strength is lessened and he has dropped objects.  He woke up 1 morning with an entire left arm being completely numb.  He also endorses chronic low back pain for 40 years and has a history of undergoing a lumbar laminectomy.  He also has a history of undergoing an C5-C6 anterior cervical discectomy and fusion.  He underwent a C5-C6 anterior cervical discectomy and fusion performed Dr. Carmen Messina on 7/29/2011 with "[employer] primary anterior cervical plating system.  He has undergone physical therapy for 2 years time which is only exacerbated his pain.  He also has undergone a cervical epidural steroid injections.  The patient has an MRI cervical spine without contrast performed on 12/20/2024 when the cervical spine that demonstrates a history of a prior C5-C6 anterior cervical discectomy and fusion construct with proper positioning of the anterior interbody device.  At C3-C4 there is a disc osteophyte complex that effaces the ventral CSF signal and ultimately results in concentric spinal stenosis with the AP diameter of the canal being 8.3 mm at C3-C4.  There is moderate central and moderate neuroforaminal stenosis at this level.  At C4-C5 there is mild spinal stenosis.  C6-C7 there is worsening moderate neuroforaminal stenosis.

## 2025-06-17 NOTE — PERIOP NOTE
TRANSFER - OUT REPORT:    Verbal report given to TOSHA Cummings on Casey Pittman  being transferred to [facility] for routine post-op       Report consisted of patient’s Situation, Background, Assessment and   Recommendations(SBAR).     Information from the following report(s) Nurse Handoff Report, Adult Overview, Surgery Report, Intake/Output, MAR, Cardiac Rhythm SR, and Neuro Assessment was reviewed with the receiving nurse.    Lines:   Peripheral IV 06/17/25 Left;Posterior Hand (Active)   Site Assessment Clean, dry & intact 06/17/25 1145   Line Status Normal saline locked 06/17/25 1145   Line Care Connections checked and tightened 06/17/25 1145   Phlebitis Assessment No symptoms 06/17/25 1145   Infiltration Assessment 0 06/17/25 1145   Dressing Status Clean, dry & intact 06/17/25 1145   Dressing Type Transparent 06/17/25 1145       Peripheral IV 06/17/25 Left Wrist (Active)   Site Assessment Clean, dry & intact 06/17/25 1145   Line Status Infusing 06/17/25 1145   Line Care Connections checked and tightened 06/17/25 1145   Phlebitis Assessment No symptoms 06/17/25 1145   Infiltration Assessment 0 06/17/25 1145   Dressing Status Clean, dry & intact 06/17/25 1145   Dressing Type Transparent 06/17/25 1145        Opportunity for questions and clarification was provided.      Patient transported with:   O2 @ 2 liters  Tech    VTE prophylaxis orders have been written for Casey Pittman.    Patient and family given floor number and nurses name.  Family updated re: pt status after security code verified.

## 2025-06-17 NOTE — ANESTHESIA PROCEDURE NOTES
Arterial Line:    An arterial line was placed using surface landmarks, in the OR for the following indication(s): continuous blood pressure monitoring.    A 20 gauge (size), 4.45 cm (length), Arrow (type) catheter was placed, into the right radial artery, secured by tape.  Anesthesia type: General    Events:  patient tolerated procedure well with no complications.6/17/2025 7:54 AM6/17/2025 7:59 AM  Resident/CRNA: Terwilliger, Chelsea, APRN - CRNA  Performed: Resident/CRNA   Preanesthetic Checklist  Completed: patient identified, IV checked, site marked, risks and benefits discussed, surgical/procedural consents, equipment checked, pre-op evaluation, timeout performed, anesthesia consent given, oxygen available, monitors applied/VS acknowledged, fire risk safety assessment completed and verbalized and blood product R/B/A discussed and consented

## 2025-06-18 VITALS
HEIGHT: 73 IN | OXYGEN SATURATION: 94 % | HEART RATE: 86 BPM | TEMPERATURE: 98.8 F | BODY MASS INDEX: 33.9 KG/M2 | WEIGHT: 255.8 LBS | DIASTOLIC BLOOD PRESSURE: 89 MMHG | RESPIRATION RATE: 18 BRPM | SYSTOLIC BLOOD PRESSURE: 159 MMHG

## 2025-06-18 PROBLEM — G95.9 CERVICAL MYELOPATHY WITH CERVICAL RADICULOPATHY (HCC): Status: ACTIVE | Noted: 2025-06-18

## 2025-06-18 PROBLEM — M54.12 CERVICAL MYELOPATHY WITH CERVICAL RADICULOPATHY (HCC): Status: ACTIVE | Noted: 2025-06-18

## 2025-06-18 PROCEDURE — 96374 THER/PROPH/DIAG INJ IV PUSH: CPT

## 2025-06-18 PROCEDURE — 2580000003 HC RX 258: Performed by: NEUROLOGICAL SURGERY

## 2025-06-18 PROCEDURE — 2500000003 HC RX 250 WO HCPCS: Performed by: NEUROLOGICAL SURGERY

## 2025-06-18 PROCEDURE — 97161 PT EVAL LOW COMPLEX 20 MIN: CPT

## 2025-06-18 PROCEDURE — 1100000000 HC RM PRIVATE

## 2025-06-18 PROCEDURE — 6360000002 HC RX W HCPCS: Performed by: NEUROLOGICAL SURGERY

## 2025-06-18 PROCEDURE — 97530 THERAPEUTIC ACTIVITIES: CPT

## 2025-06-18 PROCEDURE — 97112 NEUROMUSCULAR REEDUCATION: CPT

## 2025-06-18 PROCEDURE — G0378 HOSPITAL OBSERVATION PER HR: HCPCS

## 2025-06-18 PROCEDURE — 99024 POSTOP FOLLOW-UP VISIT: CPT

## 2025-06-18 PROCEDURE — 6370000000 HC RX 637 (ALT 250 FOR IP): Performed by: NEUROLOGICAL SURGERY

## 2025-06-18 PROCEDURE — 97165 OT EVAL LOW COMPLEX 30 MIN: CPT

## 2025-06-18 RX ORDER — METHOCARBAMOL 750 MG/1
750 TABLET, FILM COATED ORAL EVERY 8 HOURS PRN
Qty: 30 TABLET | Refills: 0 | Status: SHIPPED | OUTPATIENT
Start: 2025-06-18 | End: 2025-07-18

## 2025-06-18 RX ADMIN — MAGNESIUM GLUCONATE 500 MG ORAL TABLET 400 MG: 500 TABLET ORAL at 08:49

## 2025-06-18 RX ADMIN — HYDROMORPHONE HYDROCHLORIDE 0.5 MG: 1 INJECTION, SOLUTION INTRAMUSCULAR; INTRAVENOUS; SUBCUTANEOUS at 04:48

## 2025-06-18 RX ADMIN — CEFAZOLIN 2000 MG: 10 INJECTION, POWDER, FOR SOLUTION INTRAVENOUS at 01:16

## 2025-06-18 RX ADMIN — ATORVASTATIN CALCIUM 20 MG: 10 TABLET, FILM COATED ORAL at 08:49

## 2025-06-18 RX ADMIN — METOPROLOL TARTRATE 50 MG: 50 TABLET, FILM COATED ORAL at 08:49

## 2025-06-18 RX ADMIN — POLYETHYLENE GLYCOL 3350 17 G: 17 POWDER, FOR SOLUTION ORAL at 08:48

## 2025-06-18 RX ADMIN — BISACODYL 5 MG: 5 TABLET, COATED ORAL at 08:49

## 2025-06-18 RX ADMIN — ACETAMINOPHEN 1000 MG: 500 TABLET, FILM COATED ORAL at 04:49

## 2025-06-18 RX ADMIN — SODIUM CHLORIDE, POTASSIUM CHLORIDE, SODIUM LACTATE AND CALCIUM CHLORIDE: 600; 310; 30; 20 INJECTION, SOLUTION INTRAVENOUS at 02:52

## 2025-06-18 RX ADMIN — ACETAMINOPHEN 1000 MG: 500 TABLET, FILM COATED ORAL at 13:00

## 2025-06-18 RX ADMIN — SPIRONOLACTONE 25 MG: 25 TABLET ORAL at 08:49

## 2025-06-18 RX ADMIN — SODIUM CHLORIDE, POTASSIUM CHLORIDE, SODIUM LACTATE AND CALCIUM CHLORIDE: 600; 310; 30; 20 INJECTION, SOLUTION INTRAVENOUS at 10:58

## 2025-06-18 RX ADMIN — FAMOTIDINE 20 MG: 20 TABLET, FILM COATED ORAL at 08:49

## 2025-06-18 RX ADMIN — DOCUSATE SODIUM 50 MG AND SENNOSIDES 8.6 MG 1 TABLET: 8.6; 5 TABLET, FILM COATED ORAL at 08:48

## 2025-06-18 RX ADMIN — TAMSULOSIN HYDROCHLORIDE 0.4 MG: 0.4 CAPSULE ORAL at 08:49

## 2025-06-18 RX ADMIN — OXYCODONE 10 MG: 5 TABLET ORAL at 01:15

## 2025-06-18 RX ADMIN — LOSARTAN POTASSIUM 100 MG: 50 TABLET, FILM COATED ORAL at 08:49

## 2025-06-18 RX ADMIN — ACETAMINOPHEN 1000 MG: 500 TABLET, FILM COATED ORAL at 01:15

## 2025-06-18 RX ADMIN — SODIUM CHLORIDE, PRESERVATIVE FREE 10 ML: 5 INJECTION INTRAVENOUS at 08:49

## 2025-06-18 RX ADMIN — OXYCODONE 10 MG: 5 TABLET ORAL at 09:32

## 2025-06-18 ASSESSMENT — PAIN DESCRIPTION - ONSET
ONSET: GRADUAL
ONSET: AWAKENED FROM SLEEP
ONSET: GRADUAL

## 2025-06-18 ASSESSMENT — PAIN SCALES - GENERAL
PAINLEVEL_OUTOF10: 3
PAINLEVEL_OUTOF10: 7
PAINLEVEL_OUTOF10: 3
PAINLEVEL_OUTOF10: 3
PAINLEVEL_OUTOF10: 7
PAINLEVEL_OUTOF10: 7

## 2025-06-18 ASSESSMENT — PAIN DESCRIPTION - LOCATION
LOCATION: NECK

## 2025-06-18 ASSESSMENT — PAIN DESCRIPTION - ORIENTATION
ORIENTATION: ANTERIOR

## 2025-06-18 ASSESSMENT — PAIN - FUNCTIONAL ASSESSMENT
PAIN_FUNCTIONAL_ASSESSMENT: ACTIVITIES ARE NOT PREVENTED

## 2025-06-18 ASSESSMENT — PAIN DESCRIPTION - FREQUENCY
FREQUENCY: INTERMITTENT

## 2025-06-18 ASSESSMENT — PAIN DESCRIPTION - PAIN TYPE
TYPE: SURGICAL PAIN
TYPE: ACUTE PAIN;SURGICAL PAIN
TYPE: SURGICAL PAIN

## 2025-06-18 ASSESSMENT — PAIN DESCRIPTION - DESCRIPTORS
DESCRIPTORS: ACHING;SORE
DESCRIPTORS: ACHING
DESCRIPTORS: SHARP;SQUEEZING

## 2025-06-18 NOTE — CARE COORDINATION
CM screened chart for potential discharge needs. Patient admitted for elective surgery with Dr. White and is post-op day one with therapy recommendations for no additional skilled needs at discharge. No CM needs noted at this time. See service assessment below.    Please consult CM for any additional noted concerns/needs.       06/18/25 1102   Service Assessment   Patient Orientation Alert and Oriented   Cognition Alert   History Provided By Medical Record   Primary Caregiver Self   Accompanied By/Relationship N/A   Support Systems Friends/Neighbors   Patient's Healthcare Decision Maker is: Legal Next of Kin   PCP Verified by CM Yes   Last Visit to PCP Within last 3 months   Prior Functional Level Independent in ADLs/IADLs   Current Functional Level Independent in ADLs/IADLs   Can patient return to prior living arrangement Yes   Ability to make needs known: Good   Family able to assist with home care needs: Yes   Would you like for me to discuss the discharge plan with any other family members/significant others, and if so, who? No   Financial Resources Medicare  ([facility] Medicare)   Community Resources None   CM/SW Referral Other (see comment)  (No CM consult)   Social/Functional History   Lives With Alone   Type of Home Mobile home   Home Layout One level   Occupation On disability   Discharge Planning   Current Services Prior To Admission None   DME Ordered? No   Potential Assistance Purchasing Medications No   Type of Home Care Services None   Patient expects to be discharged to: Trailer/mobile home   Services At/After Discharge   Transition of Care Consult (CM Consult) N/A   Services At/After Discharge None

## 2025-06-18 NOTE — PROGRESS NOTES
RT called for pt wheezing, upon assessment pt found to be working with PT in no distress and coarse BBS. RT notified charge RN of above results and told to call if PRN was needed after PT.

## 2025-06-18 NOTE — DISCHARGE SUMMARY
[facility]      NEUROSURGERY DISCHARGE SUMMARY     Patient Name: Casey Pittman    Patient MRN: [id number]    Date of Admission: 6/17/2025    Date of Discharge: 6/18/2025     Length of Hospital Stay: 0    Pre-Procedure Diagnosis:   Pre-Op Diagnosis Codes:      * Cervical spinal stenosis [M48.02]     * Neck pain [M54.2]     * Cervical radiculopathy [M54.12]     * Weakness of both arms [R29.898]     * Numbness and tingling of both upper extremities while sleeping [R20.0, R20.2]  Post-Procedure Diagnosis: SAME AS ABOVE     Admitting Attending: Navjot White MD  Discharge Attending: TEJAS Mcdaniel CNP and patient to maintain scheduled follow-up with primary admitting neurosurgery attending    Past Medical History:   Diagnosis Date    Acromioclavicular joint arthritis     left shoulder    Allergic rhinitis 8/11/2016    Arrhythmia     heart rate sometimes increases     Arthritis     Cervical radiculopathy 8/11/2016    Chronic pain     neck and lower back    COPD (chronic obstructive pulmonary disease) ([facility]) 08/11/2016    managed with inhalers    Deficiency, lipoprotein 8/11/2016    Depression 8/11/2016    Disorders of bursae and tendons in shoulder region, unspecified 12/23/2011    Dyslipidemia 8/11/2016    Dyspnea 8/11/2016    ED (erectile dysfunction) 8/11/2016    Encounter for long-term (current) use of medications 8/11/2016    Encounter for prostate cancer screening 8/11/2016    Gout 8/11/2016    managed with med    Hypercholesterolemia     Hypertension     controlled with meds    Insomnia     Leg pain 8/11/2016    Lumbar back pain 8/11/2016    Metabolic syndrome 8/11/2016    SHEY on CPAP     Primary localized osteoarthrosis, shoulder region 11/10/2011    RA (rheumatoid arthritis) ([facility])     Seasonal allergies     Sprain and strain of other specified sites of shoulder and upper arm 11/10/2011    Superior glenoid labrum lesion 11/10/2011    Supraspinatus (muscle) (tendon) sprain

## 2025-06-18 NOTE — PROGRESS NOTES
End of Shift-Night Spine Eras Patient  1 Day Post-Op   Ideal body weight: 79.9 kg (176 lb 2.4 oz)    OOB POD#0 (within 6 hours of end anesthesia-if not contraindicated) Yes    Ambulated in jarrell 0 times.     Up to chair 4 times    Lidocaine: Yes Discontinued POD#1 approx 0600 Yes    PRN Pain Medications Used?: Yes    IS Used 10x/hr while awke: Yes     DRAINS? Yes    Vazquez? No    BM?  No   Passing flatus? Yes    TEDs Yes    SCDs Yes         Signed By: SAADIA PANDA RN     June 18, 2025

## 2025-06-18 NOTE — PROGRESS NOTES
ACUTE OCCUPATIONAL THERAPY GOALS:   (Developed with and agreed upon by patient and/or caregiver.)  1. Patient will complete lower body bathing and dressing with MOD I and adaptive equipment as needed.   2.Patient will complete upper body bathing and dressing with MOD I and adaptive equipment as needed.  3. Patient will complete toileting with MOD I.   4. Patient will tolerate 30 minutes of OT treatment with 1-2 rest breaks to increase activity tolerance for ADLs.   5. Patient will complete functional transfers with MOD I and adaptive equipment as needed.   6. Patient will complete functional activity with MOD I and adaptive equipment as needed.  7. Patient will complete simple grooming task standing at the sink with MOD I.    Timeframe: 7 visits      OCCUPATIONAL THERAPY Initial Assessment and Daily Note       OT Visit Days: 1  Acknowledge Orders  Time  OT Charge Capture  Rehab Caseload Tracker      Casey Pittman is a 59 y.o. male   PRIMARY DIAGNOSIS: Numbness and tingling of both upper extremities while sleeping  Cervical spinal stenosis [M48.02]  Neck pain [M54.2]  Cervical radiculopathy [M54.12]  Weakness of both arms [R29.898]  Numbness and tingling of both upper extremities while sleeping [R20.0, R20.2]  Cervical myelopathy with cervical radiculopathy (HCC) [G95.9, M54.12]  Procedure(s) (LRB):  CERVICAL DISCECTOMY FUSION ANTERIOR TWO LEVELS (N/A)  1 Day Post-Op  Reason for Referral: Generalized Muscle Weakness (M62.81)  Other lack of cordination (R27.8)  Difficulty in walking, Not elsewhere classified (R26.2)  Inpatient: Payor: [facility] MEDICARE / Plan: [email or web address] DUAL COMPLETE / Product Type: *No Product type* /     ASSESSMENT:     REHAB RECOMMENDATIONS:   Recommendation to date pending progress:  Setting:  No further skilled occupational therapy after discharge from hospital    Equipment:    None     ASSESSMENT:  Mr. Pittman presented to the hospital for elective c-spine surgery listed above. At

## 2025-06-18 NOTE — PROGRESS NOTES
Discharge instructions, medication side effects sheet, follow up appointment and prescriptions reviewed and explained to the patient. Patient verbalizes understanding of instructions. A copy of discharge instructions and prescriptions  have been given to patient.  Opportunity for questions provided.  IV removed. Spine precautions reviewed with patient. Advised to contact PCP about any medication changes or questions.

## 2025-06-18 NOTE — PROGRESS NOTES
TRANSFER - OUT REPORT:    Verbal report given to TOSHA Wu on Casey Pittman  being transferred to [facility] for routine progression of patient care       Report consisted of patient's Situation, Background, Assessment and   Recommendations(SBAR).     Information from the following report(s) Nurse Handoff Report was reviewed with the receiving nurse.           Lines:     Opportunity for questions and clarification was provided.      Patient transported with:

## 2025-06-18 NOTE — PROGRESS NOTES
ACUTE PHYSICAL THERAPY GOALS:   (Developed with and agreed upon by patient and/or caregiver.)  Pt will perform all bed mobility and transfers Mod (I) c use of LRAD/external supports as needed and no LOB or miss-steps in 7 therapy sessions.  Pt will ambulate 750 ft Mod (I) with use of LRAD, no LOB or miss-steps and breaks as needed in 7 therapy sessions.  Pt will negotiate 12 stairs Mod (I) with use of rail(s) PRN, no LOB or miss-steps and breaks as needed in 7 therapy sessions.  Pt will perform standing dynamic balance activities with minimal postural sway in 7 therapy sessions.  Pt will tolerate multiple sets and reps of BLE exercises in 7 therapy sessions.  6.   Pt will recite spinal precautions (I) without verbal cueing required in 7 therapy sessions.       PHYSICAL THERAPY Initial Assessment, Daily Note, and AM  (Link to Caseload Tracking: PT Visit Days : 1  Acknowledge Orders  Time In/Out  PT Charge Capture  Rehab Caseload Tracker    SPINAL PRECAUTIONS    Casey Pittman is a 59 y.o. male   PRIMARY DIAGNOSIS: Numbness and tingling of both upper extremities while sleeping  Cervical spinal stenosis [M48.02]  Neck pain [M54.2]  Cervical radiculopathy [M54.12]  Weakness of both arms [R29.898]  Numbness and tingling of both upper extremities while sleeping [R20.0, R20.2]  Cervical myelopathy with cervical radiculopathy (HCC) [G95.9, M54.12]  Procedure(s) (LRB):  CERVICAL DISCECTOMY FUSION ANTERIOR TWO LEVELS (N/A)  1 Day Post-Op  Reason for Referral: Other abnormalities of gait and mobility (R26.89)  Inpatient: Payor: [facility] MEDICARE / Plan: [employer] DUAL COMPLETE / Product Type: *No Product type* /     ASSESSMENT:     REHAB RECOMMENDATIONS:   Recommendation to date pending progress:  Setting:  Home Health Therapy vs No Needs pending clinical course    Equipment:    None  To Be Determined     ASSESSMENT:  Mr. Pittman Is a 59 y.o. male presenting to PT POD 1 s/p the above procedure; spinal precautions  time, cues, and use of external supports    Scooting [] [] [x] [] [] [] [] [] [] [] []    Sit to Supine [] [] [] [] [] [] [] [] [] [] []    Transfers    Sit to Stand [] [] [] [x] [] [] [] [] [] [] []    Bed to Chair [] [] [] [x] [] [] [] [] [] [] [] Gait belt for all out of caution   Stand to Sit [] [] [] [x] [] [] [] [] [] [] []     [] [] [] [] [] [] [] [] [] [] []    I=Independent, Mod I=Modified Independent, S=Supervision, SBA=Standby Assistance, CGA=Contact Guard Assistance,   Min=Minimal Assistance, Mod=Moderate Assistance, Max=Maximal Assistance, Total=Total Assistance, NT=Not Tested    GAIT: I Mod I S SBA CGA Min Mod Max Total  NT x2 Comments:   Level of Assistance [] [] [] [x] [] [] [] [] [] [] []    Distance 500'x1    DME Gait Belt    Gait Quality Decreased step length, Trunk sway increased, and Wide base of support    Weightbearing Status      Stairs      I=Independent, Mod I=Modified Independent, S=Supervision, SBA=Standby Assistance, CGA=Contact Guard Assistance,   Min=Minimal Assistance, Mod=Moderate Assistance, Max=Maximal Assistance, Total=Total Assistance, NT=Not Tested    PLAN:   FREQUENCY AND DURATION: 4 times/week for duration of hospital stay or until stated goals are met, whichever comes first.    THERAPY PROGNOSIS: Good    PROBLEM LIST:   (Skilled intervention is medically necessary to address:)  Decreased ADL/Functional Activities  Decreased Activity Tolerance  Decreased Balance  Decreased Gait Ability  Decreased Strength  Decreased Transfer Abilities  Increased Pain INTERVENTIONS PLANNED:   (Benefits and precautions of physical therapy have been discussed with the patient.)  Self Care Training  Therapeutic Activity  Therapeutic Exercise/HEP  Gait Training  Education       TREATMENT:   EVALUATION: LOW COMPLEXITY: (Untimed Charge)  The initial evaluation charge encompasses clinical chart review, objective assessment, interpretation of assessment, and skilled monitoring of the patient's response

## 2025-06-24 ENCOUNTER — TELEPHONE (OUTPATIENT)
Dept: ORTHOPEDIC SURGERY | Age: 60
End: 2025-06-24

## 2025-06-24 NOTE — TELEPHONE ENCOUNTER
Pt stated he has foot pain but injury was in April he doesn't have a boot. He had neck surgery last week.Offered pt an appt 6/30 but her declined pt sched 7/2/2025 @ 10:00am. LH

## 2025-06-24 NOTE — TELEPHONE ENCOUNTER
----- Message from ALBERTINA Bangura sent at 6/23/2025  3:22 PM EDT -----  Regarding: appt  Can we call and see how his foot is? Sched an appt?

## 2025-06-27 ENCOUNTER — TELEPHONE (OUTPATIENT)
Dept: NEUROSURGERY | Age: 60
End: 2025-06-27

## 2025-06-27 NOTE — TELEPHONE ENCOUNTER
Pt was discharged from Hospital 6/18. Called office today to cancel wound check due to the way he was treated at discharge. I asked if he would come in to see Jessika instead as patient states his BP has been elevated since and he is very upset. Ok by .

## 2025-06-30 ENCOUNTER — OFFICE VISIT (OUTPATIENT)
Dept: NEUROSURGERY | Age: 60
End: 2025-06-30

## 2025-06-30 DIAGNOSIS — G89.18 POST-OPERATIVE PAIN: ICD-10-CM

## 2025-06-30 DIAGNOSIS — Z48.89 ENCOUNTER FOR POST SURGICAL WOUND CHECK: Primary | ICD-10-CM

## 2025-06-30 DIAGNOSIS — Z98.1 S/P CERVICAL SPINAL FUSION: ICD-10-CM

## 2025-06-30 PROCEDURE — 99024 POSTOP FOLLOW-UP VISIT: CPT | Performed by: NURSE PRACTITIONER

## 2025-06-30 RX ORDER — OXYCODONE AND ACETAMINOPHEN 7.5; 325 MG/1; MG/1
1 TABLET ORAL EVERY 6 HOURS PRN
Qty: 20 TABLET | Refills: 0 | Status: SHIPPED | OUTPATIENT
Start: 2025-06-30 | End: 2025-07-05

## 2025-06-30 NOTE — PROGRESS NOTES
[facility] CL[facility] NOTE:   History of Present Illness:    CC: post operative wound check    Casey Pittman is a 59 y.o. male here for a postoperative wound check.  Patient is status post C3-4 and C4-5 ACDF with Dr. White on 5 - 15 - 25.  Patient states that he has times in which the pain seems to be well-managed other times the pain really creeps up there.  Patient does take Norco 10 mg 4 times daily.  Patient was not sent home from the hospital with any postoperative pain meds.  Patient states that he has been in a really severe amount of pain that is actually caused his blood pressure to become extremely elevated.  Patient would like something to take to help with his current pain.  Patient incision is clean, dry, intact with edges well-approximated.  The glue does appear to be mostly dissolved.  There is some swelling at the surgical site which is to be expected after having a two-level fusion.  There are no signs and symptoms of infection such as redness, erythema, drainage, body aches or chills or fever.  The no bending lifting twisting overhead reaching goals to still apply.  I will write the patient for Percocet 7.5 mg for 5 days.  Patient is clearly instructed to discontinue taking his Norco in its entirety while taking this medication for pain.  Once the patient has completed the 5-day course he can restart his Norco.  These medications are not to be taken simultaneously.  The patient will keep his already scheduled follow-up appointment with Dr. White.  Patient will need new AP and lateral cervical x-rays done before this visit.    Past Medical History:   Diagnosis Date    Acromioclavicular joint arthritis     left shoulder    Allergic rhinitis 8/11/2016    Arrhythmia     heart rate sometimes increases     Arthritis     Cervical radiculopathy 8/11/2016    Chronic pain     neck and lower back    COPD (chronic obstructive pulmonary disease) (HCC) 08/11/2016    managed with

## 2025-07-02 ENCOUNTER — OFFICE VISIT (OUTPATIENT)
Dept: ORTHOPEDIC SURGERY | Age: 60
End: 2025-07-02
Payer: MEDICARE

## 2025-07-02 DIAGNOSIS — S99.921A INJURY OF RIGHT FOOT, INITIAL ENCOUNTER: Primary | ICD-10-CM

## 2025-07-02 DIAGNOSIS — S92.301A MULTIPLE CLOSED FRACTURES OF METATARSAL BONE OF RIGHT FOOT, INITIAL ENCOUNTER: ICD-10-CM

## 2025-07-02 PROCEDURE — G8427 DOCREV CUR MEDS BY ELIG CLIN: HCPCS | Performed by: PHYSICIAN ASSISTANT

## 2025-07-02 PROCEDURE — 99203 OFFICE O/P NEW LOW 30 MIN: CPT | Performed by: PHYSICIAN ASSISTANT

## 2025-07-02 PROCEDURE — 3017F COLORECTAL CA SCREEN DOC REV: CPT | Performed by: PHYSICIAN ASSISTANT

## 2025-07-02 PROCEDURE — G8417 CALC BMI ABV UP PARAM F/U: HCPCS | Performed by: PHYSICIAN ASSISTANT

## 2025-07-02 PROCEDURE — 1111F DSCHRG MED/CURRENT MED MERGE: CPT | Performed by: PHYSICIAN ASSISTANT

## 2025-07-02 PROCEDURE — 1036F TOBACCO NON-USER: CPT | Performed by: PHYSICIAN ASSISTANT

## 2025-07-03 NOTE — PROGRESS NOTES
[facility]thopa[facility]uma Clinic Note    Name: Casey Pittman  YOB: 1965  Gender: male  MRN: [id number]  PCP:    CC: fractures to right foot.     HPI:   Casey Pittman is a 59 y.o. male with a chief complaint of right foot pain. States he had a fall and injured his foot around [facility]. Has had ongoing pain to his foot. He is ambulating in regular shoes today, but notes this is uncomfortable. States it feels better when he walks in boots with a harder sole. Pain has improved since time of injury but clearly has not resolved. Denies changes in sensation.  Notes long-standing pain to lower extremities related to his back and neck.     - Ambulation: slight antalgic gait    ROS/Meds/PSH/PMH/FH/SH:  Pertinent details discussed in HPI    Physical Examination:  General:  Awake and conversive, no distress, well nourished, age-appropriate.  Neurological: A&O x 3, normal coordination and tone.  Psych: Normal mood, affect and behavior. Normal thought content and judgment. Cooperative w/ exam.  Cardiovascular: RRR, + palpable pulses b/l upper extremity  Pulmonary: Chest excursion equal bilaterally, breathing non-labored    Musculoskeletal Exam:  Right lower Extremity  - Skin: no open wounds  - Normal alignment, no swelling/effusion , minimal focal TTP to midfoot  - ROM: good ankle and digit ROM  - Sensation: SILT Sa/Owens/T/SP/DP   - Motor: 5/5 TA/EHL/FHL/GS   - Digits warm, well-perfused, brisk cap refill      Imaging:   I personally reviewed and interpreted:  - XR R foot: 3v R foot demonstrates fractures of base of 2-4 metatarsals. Small fracture to medial side of base of miguelangel metatarsal. Healing evident of 2 and 4th. Minimal healing of third metatarsal.       Assessment:     ICD-10-CM    1. Injury of right foot, initial encounter  S99.921A XR FOOT RIGHT (MIN 3 VIEWS)     Ambulatory Referral to DME      2. Multiple closed fractures of metatarsal bone of right foot, initial encounter  S92.301A

## 2025-07-21 ENCOUNTER — OFFICE VISIT (OUTPATIENT)
Dept: NEUROSURGERY | Age: 60
End: 2025-07-21

## 2025-07-21 ENCOUNTER — HOSPITAL ENCOUNTER (OUTPATIENT)
Dept: GENERAL RADIOLOGY | Age: 60
Discharge: HOME | End: 2025-07-23
Payer: MEDICARE

## 2025-07-21 VITALS
SYSTOLIC BLOOD PRESSURE: 168 MMHG | WEIGHT: 255 LBS | DIASTOLIC BLOOD PRESSURE: 104 MMHG | BODY MASS INDEX: 33.8 KG/M2 | OXYGEN SATURATION: 94 % | TEMPERATURE: 97.7 F | HEIGHT: 73 IN | HEART RATE: 61 BPM

## 2025-07-21 DIAGNOSIS — M48.061 STENOSIS OF LATERAL RECESS OF LUMBAR SPINE: ICD-10-CM

## 2025-07-21 DIAGNOSIS — Z98.1 S/P CERVICAL SPINAL FUSION: ICD-10-CM

## 2025-07-21 DIAGNOSIS — M54.16 LUMBAR RADICULOPATHY: ICD-10-CM

## 2025-07-21 DIAGNOSIS — Z98.1 S/P CERVICAL SPINAL FUSION: Primary | ICD-10-CM

## 2025-07-21 DIAGNOSIS — Z98.890 S/P CERVICAL DISCECTOMY: ICD-10-CM

## 2025-07-21 DIAGNOSIS — M51.26 PROTRUSION OF LUMBAR INTERVERTEBRAL DISC: ICD-10-CM

## 2025-07-21 PROCEDURE — 72040 X-RAY EXAM NECK SPINE 2-3 VW: CPT

## 2025-07-21 PROCEDURE — 99024 POSTOP FOLLOW-UP VISIT: CPT | Performed by: NEUROLOGICAL SURGERY

## 2025-07-21 NOTE — PROGRESS NOTES
height collapse without any significant central spinal stenosis. At L5-S1 there is likely evidence of a left hemilaminectomy.  At this point the patient is healing well with respect to his anterior cervical discectomy and fusion surgery.  He does not have new imaging to review today.  We will send him to x-ray imaging following clinic visit today.  Regarding his lower extremity pain discomfort his symptoms seem most consistent with a right sided L5 radiculopathy which is consistent with his pre-MRI findings.  I would like to ensure that the disc herniation that is contacting the nerve root is either still present and see whether it has involuted at all.  If this is still present and he still has persistent refractory symptoms he may be a candidate for a minimally invasive right-sided L4-L5 hemilaminectomy medial facetectomy and discectomy.  If we proceed with discussion of surgery will discuss with [facility] regarding the ultimate plan of care for his 4 metatarsal fractures as he was released to follow-up as needed.      ICD-10-CM    1. S/P cervical spinal fusion  Z98.1       2. S/P cervical discectomy  Z98.890       3. Lumbar radiculopathy  M54.16       4. Protrusion of lumbar intervertebral disc  M51.26       5. 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

## (undated) DEVICE — SPINE NEURO: Brand: MEDLINE INDUSTRIES, INC.

## (undated) DEVICE — SOLUTION IRRIG 1000ML 09% SOD CHL USP PIC PLAS CONTAINER

## (undated) DEVICE — Device

## (undated) DEVICE — ABSORBENT, WATERPROOF, BACTERIA PROOF FILM DRESSING: Brand: OPSITE POST OP 9.5X8.5CM CTN 20

## (undated) DEVICE — SUTURE ETHILON SZ 2-0 L18IN NONABSORBABLE BLK L19MM PS-2 PRIM 593H

## (undated) DEVICE — SUTURE VICRYL + SZ 2-0 L18IN ABSRB UD CP-2 L26MM 1/2 CIR REV VCP762D

## (undated) DEVICE — AGENT HEMOSTATIC SURGIFLOW MATRIX KIT W/THROMBIN

## (undated) DEVICE — CONTAINER,SPECIMEN,O.R.STRL,4.5OZ: Brand: MEDLINE

## (undated) DEVICE — SPONGE LAP W18XL18IN WHT COT 4 PLY FLD STRUNG RADPQ DISP ST 2 PER PACK

## (undated) DEVICE — X-LARGE COTTON GLOVE: Brand: DEROYAL

## (undated) DEVICE — SUTURE MONOCRYL STRATAFIX SPRL + SZ 4-0 L12IN ABSRB UD PS-2 SXMP1B117

## (undated) DEVICE — DRAPE C ARM W/ POLY STRP W42XL72IN FOR MOB XR

## (undated) DEVICE — SPONGE,NEURO,0.5"X0.5",XR,STRL,10/PK: Brand: MEDLINE

## (undated) DEVICE — SYRINGE 20ML LL S/C 50

## (undated) DEVICE — DISPOSABLE STANDARD BIPOLAR FORCEPS, NON-STICK,: Brand: SPETZLER-MALIS

## (undated) DEVICE — CARBIDE MATCH HEAD

## (undated) DEVICE — SPLINT CAST W5XL30IN GRN STRENGTH PLSTR OF PARIS FAST SET

## (undated) DEVICE — BLADE CLIPPER GEN PURP NS

## (undated) DEVICE — SHEET,DRAPE,53X77,STERILE: Brand: MEDLINE

## (undated) DEVICE — NEEDLE FLTR 18GA L1.5IN MEM THK5UM BLNT DISP

## (undated) DEVICE — INTENDED FOR TISSUE SEPARATION, AND OTHER PROCEDURES THAT REQUIRE A SHARP SURGICAL BLADE TO PUNCTURE OR CUT.: Brand: BARD-PARKER ® STAINLESS STEEL BLADES

## (undated) DEVICE — SLIM BODY SKIN STAPLER: Brand: APPOSE ULC

## (undated) DEVICE — APPLICATOR MEDICATED 26 CC SOLUTION HI LT ORNG CHLORAPREP

## (undated) DEVICE — SPONGE,NEURO,0.5"X1",XR,STRL,LF,10/PK: Brand: MEDLINE

## (undated) DEVICE — BLADE ES ELASTOMERIC COAT INSUL DURABLE BEND UPTO 90DEG

## (undated) DEVICE — BIT DRILL 2.3X12MM OZARK CERVICAL SYSTEM

## (undated) DEVICE — REM POLYHESIVE ADULT PATIENT RETURN ELECTRODE: Brand: VALLEYLAB

## (undated) DEVICE — BLADE ASSEMB CLP HAIR FINE --

## (undated) DEVICE — KIT EVAC 0.13IN RECT TB DIA10FR 400CC PVC 3 SPR Y CONN DRN

## (undated) DEVICE — SUTURE MCRYL SZ 2-0 L27IN ABSRB VLT CT-1 L36MM 1/2 CIR TAPR Y339H

## (undated) DEVICE — SYRINGE MED 10ML LUERLOCK TIP W/O SFTY DISP

## (undated) DEVICE — (D)PREP SKN CHLRAPRP APPL 26ML -- CONVERT TO ITEM 371833

## (undated) DEVICE — DRAPE SHT 3 QTR PROXIMA 53X77 --

## (undated) DEVICE — BUTTON SWITCH PENCIL BLADE ELECTRODE, HOLSTER: Brand: EDGE

## (undated) DEVICE — SOLUTION IRRIG 1000ML H2O PIC PLAS SHATTERPROOF CONTAINER

## (undated) DEVICE — PADDING CAST W4INXL4YD ST COT COHESIVE HND TEARABLE SPEC

## (undated) DEVICE — ZIMMER® STERILE DISPOSABLE TOURNIQUET CUFF WITH PLC, DUAL PORT, SINGLE BLADDER, 30 IN. (76 CM)

## (undated) DEVICE — PADDING CAST W6INXL4YD ST COT COHESIVE HND TEARABLE SPEC

## (undated) DEVICE — GLOVE ORANGE PI 7   MSG9070

## (undated) DEVICE — SYRINGE MED 30ML STD CLR PLAS LUERLOCK TIP N CTRL DISP

## (undated) DEVICE — SURGIFOAM SPNG SZ 100

## (undated) DEVICE — COTTON ROLL,1 LB: Brand: CURITY

## (undated) DEVICE — MARKER SURG SKIN UTIL BLK REG TIP NONSMEARING W/ 6IN RUL

## (undated) DEVICE — SUTURE VICRYL SZ 3-0 L18IN ABSRB UD L26MM SH 1/2 CIR J864D

## (undated) DEVICE — AMD ANTIMICROBIAL GAUZE SPONGES,12 PLY USP TYPE VII, 0.2% POLYHEXAMETHYLENE BIGUANIDE HCI (PHMB): Brand: CURITY

## (undated) DEVICE — SOLUTION IV 1000ML 0.9% SOD CHL

## (undated) DEVICE — BANDAGE E W6INXL11YD CLP CLSR DBL LEN FLEX-MASTER

## (undated) DEVICE — SPONGE: SPECIALTY PEANUT XR 100/CS: Brand: MEDICAL ACTION INDUSTRIES

## (undated) DEVICE — CASPAR DISTR PIN12MMSTER: Brand: AESCULAP

## (undated) DEVICE — TUBING, SUCTION, 1/4" X 10', STRAIGHT: Brand: MEDLINE

## (undated) DEVICE — GLOVE SURG SZ 75 L12IN FNGR THK79MIL GRN LTX FREE

## (undated) DEVICE — SUTURE MCRYL SZ 0 L27IN ABSRB VLT CT-1 L36MM 1/2 CIR TAPR Y340H

## (undated) DEVICE — 1LYRTR 16FR10ML 100%SILI SNAP: Brand: MEDLINE INDUSTRIES, INC.

## (undated) DEVICE — LIQUIBAND RAPID ADHESIVE 36/CS 0.8ML: Brand: MEDLINE

## (undated) DEVICE — SUREFIT, DUAL DISPERSIVE ELECTRODE, CONTACT QUALITY MONITOR: Brand: SUREFIT

## (undated) DEVICE — SUTURE VICRYL + SZ 3-0 L18IN ABSRB UD SH 1/2 CIR TAPERCUT NDL VCP864D